# Patient Record
Sex: FEMALE | Race: WHITE | Employment: FULL TIME | URBAN - METROPOLITAN AREA
[De-identification: names, ages, dates, MRNs, and addresses within clinical notes are randomized per-mention and may not be internally consistent; named-entity substitution may affect disease eponyms.]

---

## 2018-03-17 ENCOUNTER — HOSPITAL ENCOUNTER (OUTPATIENT)
Dept: CT IMAGING | Age: 28
Discharge: HOME | End: 2018-03-17
Attending: OTOLARYNGOLOGY
Payer: COMMERCIAL

## 2018-03-17 DIAGNOSIS — J32.9 SINUSITIS: ICD-10-CM

## 2018-03-17 PROCEDURE — 70486 CT MAXILLOFACIAL W/O DYE: CPT

## 2018-09-07 PROBLEM — N20.0 RENAL STONE: Status: ACTIVE | Noted: 2018-09-07

## 2018-12-03 PROBLEM — M62.89 PELVIC FLOOR DYSFUNCTION IN FEMALE: Status: ACTIVE | Noted: 2018-12-03

## 2018-12-03 PROBLEM — K58.0 IRRITABLE BOWEL SYNDROME WITH DIARRHEA: Status: ACTIVE | Noted: 2018-03-28

## 2018-12-18 ENCOUNTER — OFFICE VISIT (OUTPATIENT)
Dept: CARDIOLOGY CLINIC | Age: 28
End: 2018-12-18

## 2018-12-18 VITALS
HEIGHT: 62 IN | BODY MASS INDEX: 24.66 KG/M2 | SYSTOLIC BLOOD PRESSURE: 109 MMHG | HEART RATE: 93 BPM | DIASTOLIC BLOOD PRESSURE: 72 MMHG | WEIGHT: 134 LBS

## 2018-12-18 DIAGNOSIS — Z76.89 ENCOUNTER TO ESTABLISH CARE: ICD-10-CM

## 2018-12-18 DIAGNOSIS — R01.1 HEART MURMUR: Primary | ICD-10-CM

## 2018-12-18 RX ORDER — CYCLOBENZAPRINE HCL 10 MG
TABLET ORAL AS NEEDED
COMMUNITY
End: 2019-02-25

## 2018-12-18 RX ORDER — NORGESTIMATE AND ETHINYL ESTRADIOL 0.25-0.035
1 KIT ORAL DAILY
COMMUNITY
End: 2019-07-05

## 2018-12-18 RX ORDER — ESCITALOPRAM OXALATE 10 MG/1
10 TABLET ORAL DAILY
COMMUNITY
End: 2019-02-25

## 2018-12-18 NOTE — PROGRESS NOTES
HISTORY OF PRESENT ILLNESS  Marizol Landrum is a 33 y.o. female. Patient is referred here for evaluation of heart murmur. She has no history of congenital heart disease. She had incidental finding of a heart murmur. Patient denies any shortness of breath, dizziness, palpitation, chest pain or other cardiac symptoms. She is fairly active and has no complaints with activity. New Patient   The history is provided by the patient. This is a recurrent problem. The current episode started more than 1 week ago. The problem occurs constantly. The problem has not changed since onset. Pertinent negatives include no chest pain and no shortness of breath. Nothing aggravates the symptoms. Nothing relieves the symptoms. Review of Systems   Constitutional: Negative for malaise/fatigue. Respiratory: Negative for cough, shortness of breath and wheezing. Cardiovascular: Negative for chest pain, palpitations, orthopnea, claudication, leg swelling and PND. Gastrointestinal: Negative for nausea and vomiting. Musculoskeletal: Negative for falls. Neurological: Negative for dizziness. Endo/Heme/Allergies: Does not bruise/bleed easily. Family History   Problem Relation Age of Onset    Atrial Fibrillation Maternal Grandmother     Atrial Fibrillation Maternal Grandfather     Other Paternal Grandmother         heart mumur       Past Medical History:   Diagnosis Date    Dysuria     Kidney stone     Pelvic pain     Urethritis        History reviewed. No pertinent surgical history. Social History     Tobacco Use    Smoking status: Never Smoker    Smokeless tobacco: Never Used   Substance Use Topics    Alcohol use: No     Frequency: Never       No Known Allergies    Prior to Admission medications    Medication Sig Start Date End Date Taking? Authorizing Provider   escitalopram oxalate (LEXAPRO) 10 mg tablet Take 10 mg by mouth daily.    Yes Provider, Historical   norgestimate-ethinyl estradiol (ORTHO-CYCLEN, 28,) 0.25-35 mg-mcg tab Take 1 Tab by mouth daily. Yes Provider, Historical   cyclobenzaprine (FLEXERIL) 10 mg tablet Take  by mouth as needed for Muscle Spasm(s). Yes Provider, Historical   naproxen sodium (ALEVE) 220 mg cap Take  by mouth. Provider, Historical   cyclobenzaprine (FLEXERIL) 10 mg tablet Take 1 Tab by mouth three (3) times daily as needed for Muscle Spasm(s). 12/3/18   Derick Frost MD   norgestimate-ethinyl estradiol (ORTHO TRI-CYCLEN LO) 0.18/0.215/0.25 mg-25 mcg tab  6/12/18   Provider, Historical         Visit Vitals  /72   Pulse 93   Ht 5' 2\" (1.575 m)   Wt 60.8 kg (134 lb)   BMI 24.51 kg/m²         Physical Exam   Constitutional: She is oriented to person, place, and time. She appears well-developed and well-nourished. Neck: No JVD present. Cardiovascular: Normal rate, regular rhythm and intact distal pulses. Exam reveals no gallop and no friction rub. No murmur heard. Pulmonary/Chest: Effort normal and breath sounds normal. No respiratory distress. She has no wheezes. She has no rales. She exhibits no tenderness. Abdominal: Soft. She exhibits no distension and no mass. There is no tenderness. Musculoskeletal: Normal range of motion. She exhibits no edema. Neurological: She is alert and oriented to person, place, and time. Skin: Skin is warm and dry. Psychiatric: She has a normal mood and affect. ASSESSMENT and PLAN    Ms. Hilda Izquierdo has a reminder for a \"due or due soon\" health maintenance. I have asked that she contact her primary care provider for follow-up on this health maintenance. No flowsheet data found. Assessment         ICD-10-CM ICD-9-CM    1. Heart murmur R01.1 785.2 AMB POC EKG ROUTINE W/ 12 LEADS, INTER & REP      ECHO ADULT COMPLETE   2. Encounter to establish care Z76.89 V65.8        There are no discontinued medications.     Orders Placed This Encounter    AMB POC EKG ROUTINE W/ 12 LEADS, INTER & REP     Order Specific Question:   Reason for Exam:     Answer:   heart murmur     12/2018 - Referred by OBGYN for heart murmur. She is asymptomatic. None audible today, likely flow murmur. Will obtain echocardiogram. EKG NSR. She is to have surgery for kidney stones in January. Follow-up Disposition:  Return in about 2 weeks (around 1/1/2019). I have independently evaluated taken history and examined the patient. All relevant labs and testing data's are reviewed. Care plan discussed and updated after review.   Chiqui Thapa MD

## 2018-12-18 NOTE — LETTER
Rene Shaikh 1990 12/18/2018 Dear Lloyd Hogue MD 
 
I had the pleasure of evaluating  Ms. Rina Dodd in office today. Below are the relevant portions of my assessment and plan of care. ICD-10-CM ICD-9-CM 1. Heart murmur R01.1 785.2 AMB POC EKG ROUTINE W/ 12 LEADS, INTER & REP  
   ECHO ADULT COMPLETE 2. Encounter to establish care Z76.89 V65.8 Current Outpatient Medications Medication Sig Dispense Refill  escitalopram oxalate (LEXAPRO) 10 mg tablet Take 10 mg by mouth daily.  norgestimate-ethinyl estradiol (ORTHO-CYCLEN, 28,) 0.25-35 mg-mcg tab Take 1 Tab by mouth daily.  cyclobenzaprine (FLEXERIL) 10 mg tablet Take  by mouth as needed for Muscle Spasm(s). Orders Placed This Encounter  AMB POC EKG ROUTINE W/ 12 LEADS, INTER & REP Order Specific Question:   Reason for Exam: Answer:   heart murmur  escitalopram oxalate (LEXAPRO) 10 mg tablet Sig: Take 10 mg by mouth daily.  norgestimate-ethinyl estradiol (ORTHO-CYCLEN, 28,) 0.25-35 mg-mcg tab Sig: Take 1 Tab by mouth daily.  cyclobenzaprine (FLEXERIL) 10 mg tablet Sig: Take  by mouth as needed for Muscle Spasm(s). If you have questions, please do not hesitate to call me. I look forward to following Ms. Rina Dodd along with you. Sincerely, Ewa Espino MD

## 2018-12-18 NOTE — PATIENT INSTRUCTIONS
Schedule echo    Follow up after testing         Transthoracic Echocardiogram: About This Test  What is it? An echocardiogram (also called an echo) uses sound waves to make an image of your heart. A device called a transducer sends sound waves that echo off your heart and back to the transducer. These echoes are turned into moving pictures of your heart that can be seen on a video screen. In a transthoracic echocardiogram (TTE), the transducer is moved across your chest or belly. A TTE is the most common type of echocardiogram.  Why is this test done? This test is done to check your heart health. It's used for many reasons. Your doctor may do an echocardiogram to:  · Check a heart murmur. · Look for the cause of shortness of breath or unexplained chest pain or pressure. · Check how well your heart is pumping blood. · Check to see how well your heart valves are working. · Look for blood clots inside your heart. What happens during the test?  · You will remove your clothes above your waist. You may be given a cloth or paper covering to use during the test.  · You will lie on your back or on your left side on a bed or table. · You may receive medicine through a vein (intravenously, or IV). The IV can be used to give you a contrast material, which helps your doctor get good views of your heart. · Small pads or patches (electrodes) will be taped to your arms and legs to record your heart rate during the test.  · A small amount of gel will be rubbed on the side of your chest to help  the sound waves. · The transducer will be pressed firmly against your chest and moved slowly back and forth. It is usually moved to different areas on your chest or belly to get specific views of your heart. · You will be asked to do several things, such as hold very still, breathe in and out very slowly, hold your breath, or lie on your left side. This test usually takes 30 to 60 minutes.   What else should you know about the test?  · You will not have any pain from an echocardiogram. You may have a brief, sharp pain if an intravenous (IV) needle is placed in a vein in your arm. · No electricity passes through your body during the test. There is no danger of getting an electrical shock. · You do not receive any radiation. What happens after the test?  · You will probably be able to go home right away. · You can go back to your usual activities right away. Follow-up care is a key part of your treatment and safety. Be sure to make and go to all appointments, and call your doctor if you are having problems. It's also a good idea to keep a list of the medicines you take. Ask your doctor when you can expect to have your test results. Where can you learn more? Go to http://rebel-sravanthi.info/. Enter E130 in the search box to learn more about \"Transthoracic Echocardiogram: About This Test.\"  Current as of: December 6, 2017  Content Version: 11.8  © 8074-6775 [employer]wise, Incorporated. Care instructions adapted under license by [employer] (which disclaims liability or warranty for this information). If you have questions about a medical condition or this instruction, always ask your healthcare professional. [place] [address] any warranty or liability for your use of this information.     [address] [facility] Jesse Salas, [address]   Echo schedule Dec. 27 check in at 10:45 am

## 2018-12-27 ENCOUNTER — HOSPITAL ENCOUNTER (OUTPATIENT)
Dept: NON INVASIVE DIAGNOSTICS | Age: 28
Discharge: HOME | End: 2018-12-27
Attending: NURSE PRACTITIONER
Payer: COMMERCIAL

## 2018-12-27 VITALS
WEIGHT: 134 LBS | BODY MASS INDEX: 24.66 KG/M2 | DIASTOLIC BLOOD PRESSURE: 72 MMHG | HEIGHT: 62 IN | SYSTOLIC BLOOD PRESSURE: 109 MMHG

## 2018-12-27 DIAGNOSIS — R01.1 HEART MURMUR: ICD-10-CM

## 2018-12-27 LAB
ECHO AO ROOT DIAM: 2.63 CM
ECHO LA AREA 4C: 14.4 CM2
ECHO LA VOL 2C: 25.79 ML (ref 22–52)
ECHO LA VOL 4C: 36.16 ML (ref 22–52)
ECHO LA VOL BP: 33.19 ML (ref 22–52)
ECHO LA VOL/BSA BIPLANE: 20.58 ML/M2 (ref 16–28)
ECHO LA VOLUME INDEX A2C: 16 ML/M2 (ref 16–28)
ECHO LA VOLUME INDEX A4C: 22.43 ML/M2 (ref 16–28)
ECHO LV E' LATERAL VELOCITY: 17.46 CM/S
ECHO LV E' SEPTAL VELOCITY: 14.13 CM/S
ECHO LV INTERNAL DIMENSION DIASTOLIC: 3.75 CM (ref 3.9–5.3)
ECHO LV INTERNAL DIMENSION SYSTOLIC: 2.05 CM
ECHO LV IVSD: 0.64 CM (ref 0.6–0.9)
ECHO LV MASS 2D: 73.9 G (ref 67–162)
ECHO LV MASS INDEX 2D: 45.8 G/M2 (ref 43–95)
ECHO LV POSTERIOR WALL DIASTOLIC: 0.77 CM (ref 0.6–0.9)
ECHO LVOT DIAM: 1.71 CM
ECHO LVOT PEAK GRADIENT: 5 MMHG
ECHO LVOT PEAK VELOCITY: 112.3 CM/S
ECHO LVOT VTI: 22.66 CM
ECHO MV A VELOCITY: 57.82 CM/S
ECHO MV E DECELERATION TIME (DT): 162.1 MS
ECHO MV E VELOCITY: 0.87 CM/S
ECHO MV E/A RATIO: 0.02
ECHO MV E/E' LATERAL: 0.05
ECHO MV E/E' RATIO (AVERAGED): 0.06
ECHO MV E/E' SEPTAL: 0.06
ECHO PULMONARY ARTERY SYSTOLIC PRESSURE (PASP): 20 MMHG
ECHO RV TAPSE: 2.07 CM (ref 1.5–2)
ECHO TV REGURGITANT MAX VELOCITY: 205.32 CM/S
ECHO TV REGURGITANT PEAK GRADIENT: 16.9 MMHG

## 2018-12-27 PROCEDURE — 93306 TTE W/DOPPLER COMPLETE: CPT

## 2019-02-13 ENCOUNTER — HOSPITAL ENCOUNTER (OUTPATIENT)
Dept: PHYSICAL THERAPY | Age: 29
Discharge: HOME | End: 2019-02-13
Payer: COMMERCIAL

## 2019-02-13 PROCEDURE — 97110 THERAPEUTIC EXERCISES: CPT

## 2019-02-13 PROCEDURE — 97161 PT EVAL LOW COMPLEX 20 MIN: CPT

## 2019-02-13 PROCEDURE — 97140 MANUAL THERAPY 1/> REGIONS: CPT

## 2019-02-13 NOTE — PROGRESS NOTES
PT DAILY TREATMENT NOTE 10-18    Patient Name: Sarrah Pallas  Date:2019  : 1990  [x]  Patient  Verified  Payor: Patricia Lopez / Plan: Anthony Smith / Product Type: PPO /    In time:3:47  Out time:4:45  Total Treatment Time (min): 58  Visit #: 1 of 10     Treatment Area: Low back pain [M54.5]        SUBJECTIVE  Pain Level (0-10 scale): 2  []constant [x]intermittent []improving []worsening []no change since onset     Any medication changes, allergies to medications, adverse drug reactions, diagnosis change, or new procedure performed?: [x] No    [] Yes (see summary sheet for update)  Subjective functional status/changes:        Subjective:  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 She reports radicular symptoms into the left lateral thigh, buttocks and low back. She reports that she is seeing a therapist for pelvic floor issues once a week in [facility]. Current pain is now daily and limits the ability to walk as much.         Mechanism of Injury: Unknown     FOTO: 53 / 72 in 10 visits     What type of work do you do? , stooping, walking     Living Situation/Domestic Life: Lives with boyfriend in a two story home with no problem with steps  Mobility: (I)  Self Care (I)     What type of daily activities/hobbies?  Walking, running     Limitations to Activity/Recreation/PLOF: Walking, running, can't lift heavy things for fear of increased pain,      Barriers: [x]pain []financial []time []transportation []other     Motivation: High     FABQ Score: []low []elevate     Cognition: A & O x 3    Other:     Risk For Falls:   []No  []low []elevate        OBJECTIVE/EXAMINATION     Gait: Decr Trunk Rot, decr (B) Hip flx  - Left lateral shift  - Decr mobility left Innominate in stork stance  - elev Right crest  - Ant Rot left ASIS  - Post Rot left Innominate  - (+) Right Slump Test  - (+) (B) Piriformis test  - Groin pain with piriformis str   - TTP with multiple trigger points right  > left at lower abdominals  - TTP (B) distal ADD region  - Elev right sacral base  - Trunk AROM (B) Rot WNL  - Fingertip - floor at 5cm with Left SI joint pain  - Fair stability with half Roll  - Decr initiation right multifidus                      AROM PROM Strength Pain? ?     Right Left Right Left Right Left     Hip Flx         4 3+     Hip Ext                 Knee Flx         4+ 5-     Knee Ext         5 5     HS 90/90 152 150                   30 min [x]Eval                  []Re-Eval         18 min Therapeutic Exercise:  [x] See flow sheet :   Rationale: increase ROM, increase strength and improve coordination to improve the patients ability to perform mor activity     10 min Manual Therapy:  Inf glide right sacral base, (B) Innominate p/a mobs, (B) LE LAD, (B) L/S and T/S Rot mobs   Rationale: decrease pain, increase ROM and increase tissue extensibility to tolerate increased activity levels                  With   [x] TE   [] TA   [] neuro   [] other: Patient Education: [x] Review HEP    [] Progressed/Changed HEP based on:   [] positioning   [] body mechanics   [] transfers   [] heat/ice application    [] other:       Other Objective/Functional Measures:        Pain Level (0-10 scale) post treatment: 1-2     ASSESSMENT/Changes in Function:   - Improved gait  - Improved pelvic alignment and mobility with treatment program     Patient will continue to benefit from skilled PT services to modify and progress therapeutic interventions, address functional mobility deficits, address ROM deficits, address strength deficits, analyze and address soft tissue restrictions and analyze and cue movement patterns to attain remaining goals.     []  See Plan of Care  []  See progress note/recertification  []  See Discharge Summary     Progress towards goals / Updated goals:        PLAN  [x]  Upgrade activities as tolerated     [x]  Continue plan of care  []  Update interventions per flow sheet       []  Discharge due to:_  []  Other:_          Behay Balbuena, PT 2/13/2019  3:47 PM    Future Appointments   Date Time Provider Kevin Newberry   2/15/2019  8:00 AM Hodan Jc, PT [facility] SCHED   2/25/2019  6:30 AM Hodan Jc, PT [facility] SCHED   2/25/2019  8:30 AM Jose Roberto Quezada MD [facility] SCHED   3/8/2019  6:30 AM Hodan Jc, PT [facility] SCHED   3/15/2019  6:30 AM Hodan Jc, PT [facility] SCHED   3/22/2019  7:00 AM Hodan Jc, PT [facility] SCHED   3/29/2019  6:30 AM Noel Padilla, PT [facility]

## 2019-02-18 ENCOUNTER — HOSPITAL ENCOUNTER (OUTPATIENT)
Dept: PHYSICAL THERAPY | Age: 29
Discharge: HOME | End: 2019-02-18
Payer: COMMERCIAL

## 2019-02-18 PROCEDURE — 97110 THERAPEUTIC EXERCISES: CPT

## 2019-02-18 PROCEDURE — 97140 MANUAL THERAPY 1/> REGIONS: CPT

## 2019-02-18 NOTE — PROGRESS NOTES
PT DAILY TREATMENT NOTE 10-18    Patient Name: Lesly Cope  Date:2019  : 1990  [x]  Patient  Verified  Payor: Sanchez Lancaster / Plan: William Alcantar RPN / Product Type: Commerical /    In time:3:36  Out time:4:22  Total Treatment Time (min): 46  Visit #: 2 of 10    Medicare/BCBS Only   Total Timed Codes (min):   1:1 Treatment Time:         Treatment Area: Low back pain [M54.5]    SUBJECTIVE  Pain Level (0-10 scale): 4/10  Any medication changes, allergies to medications, adverse drug reactions, diagnosis change, or new procedure performed?: [x] No    [] Yes (see summary sheet for update)  Subjective functional status/changes:   [] No changes reported  Pt report compliance with HEP but states her pelvic floor therapist does not want her performing any crunch ex. OBJECTIVE    16 min Therapeutic Exercise:  [] See flow sheet :   Rationale: increase ROM and increase strength to improve the patients ability to perform ADls with less difficulty. 30 min Manual Therapy:  Alignment check, MET to correct left upslip and left post. Rotated innominate. Rationale: decrease pain, increase ROM and increase tissue extensibility to perform ADLs with less difficulty. With   [] TE   [] TA   [] neuro   [] other: Patient Education: [x] Review HEP    [] Progressed/Changed HEP based on:   [] positioning   [] body mechanics   [] transfers   [] heat/ice application    [] other:      Other Objective/Functional Measures:      Pain Level (0-10 scale) post treatment: 0/10    ASSESSMENT/Changes in Function: Initiated ex. Per flow sheet. Held crunch ex. As requested by pelvic floor therapist.TC with some ex. Due to pt. Education and alignment correction, will resume next visit. No p! Was reported after therapy.     Patient will continue to benefit from skilled PT services to modify and progress therapeutic interventions, address functional mobility deficits, address ROM deficits, address strength deficits, analyze and address soft tissue restrictions, analyze and cue movement patterns, analyze and modify body mechanics/ergonomics and assess and modify postural abnormalities to attain remaining goals. []  See Plan of Care  []  See progress note/recertification  []  See Discharge Summary         Progress towards goals / Updated goals:  Short Term Goals: To be accomplished in 5 treatments:  1. Pt will be compliant and independent with HEP in order to facilitate PT sessions and aid with self management              Eval Status:  Initiated              Current Status:MET. Pt reports compliance. 2/18/19  2. Pt to tolerate 30 min or more of TE and/or Interventions w/o increased s/s              Eval Status:  Initiated              Current Status:                 Long Term Goals: To be accomplished in 10 treatments:  1. 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 Pt will demonstrate good pelvic mobility upon arrival to therapy session to show good core stability to tolerate changing positions without difficulty                Eval Status:  Initiated              Current Status:  4. Pt will will ambulate 1 mile on TM with good tolerance and no added pain to return to walking for exercise                Eval Status:  Initiated              Current Status:  5.   Pt will improve FOTO score to 72 in 10 visits to show significant improvement in function for progress to little difficulty performing usual work, activities and hobbies              Eval Status: 53              Current Status:        PLAN  [x]  Upgrade activities as tolerated     [x]  Continue plan of care  []  Update interventions per flow sheet       []  Discharge due to:_  []  Other:_      Kan Joseph PTA 2/18/2019  4:57 PM    Future Appointments   Date Time Provider Kevin Newberry   2/20/2019  2:30 PM Carolynn Calloway, [address]   2/21/2019  4:00 PM Live Riding, PT [facility]   2/25/2019  6:30 AM Carolynn Calloway, PT [facility] SCHED   2/25/2019  8:30 AM MD María Brown   2/25/2019  4:00 PM Live Riding, PT [facility]   2/27/2019  4:00 PM Wadell Hind [facility]   3/5/2019  4:30 PM Live Riding, PT [facility]   3/7/2019  4:00 PM Wadell Hind [facility]   3/8/2019  6:30 AM Carolynn Calloway, PT [facility] SCHED   3/13/2019  4:30 PM Live Riding, PT [facility]   3/15/2019  6:30 AM Carolynn Calloway, PT [facility] SCHED   3/22/2019  7:00 AM Carolynn Calloway, PT [facility] SCHED   3/29/2019  6:30 AM Funmilayo Diaz, PT [facility]

## 2019-02-21 ENCOUNTER — HOSPITAL ENCOUNTER (OUTPATIENT)
Dept: PHYSICAL THERAPY | Age: 29
Discharge: HOME | End: 2019-02-21
Payer: COMMERCIAL

## 2019-02-21 PROCEDURE — 97530 THERAPEUTIC ACTIVITIES: CPT

## 2019-02-21 PROCEDURE — 97140 MANUAL THERAPY 1/> REGIONS: CPT

## 2019-02-21 PROCEDURE — 97110 THERAPEUTIC EXERCISES: CPT

## 2019-02-21 NOTE — PROGRESS NOTES
PT DAILY TREATMENT NOTE 10-18    Patient Name: Ananth Naranjo  Date:2019  : 1990  [x]  Patient  Verified  Payor: Gato Benavides / Plan: Mahesh Still RPN / Product Type: Commerical /    In time:4:09  Out time:5:11  Total Treatment Time (min): 62  Visit #: 3 of 10    Treatment Area: Low back pain [M54.5]    SUBJECTIVE  Pain Level (0-10 scale): 0  Any medication changes, allergies to medications, adverse drug reactions, diagnosis change, or new procedure performed?: [x] No    [] Yes (see summary sheet for update)  Subjective functional status/changes:   [] No changes reported  Doing good just a little sore.   Doing all of the exercises    OBJECTIVE      31 min Therapeutic Exercise:  [x] See flow sheet :   Rationale: increase ROM, increase strength and improve coordination to improve the patients ability to tolerate normal activity    8 min Therapeutic Activity:  [x]  See flow sheet :   Rationale: increase ROM, increase strength and improve coordination  to improve the patients ability to ambulate moderate distance without difficulty     23 min Manual Therapy:  Inf glide right sacral base, (B) Innominate p/a mobs, (B) LE LAD, (B) L/S and T/S Rot mobs, STM/DTM (B) Lateral thigh and HS     Rationale: decrease pain, increase ROM, increase tissue extensibility and decrease trigger points to perform increased ADL          With   [] TE   [] TA   [] neuro   [] other: Patient Education: [x] Review HEP    [] Progressed/Changed HEP based on:   [] positioning   [] body mechanics   [] transfers   [] heat/ice application    [] other:      Other Objective/Functional Measures:   - Elev right sacral base  - Elev right crest  - decr pelvic sway left with ambulation  - Initiate Core strengthening     Pain Level (0-10 scale) post treatment: 0    ASSESSMENT/Changes in Function:   - Improved pelvic alignment and mobility with ambulation after Manual therapy  - Good exercise participation today with good tolerance to core training    Patient will continue to benefit from skilled PT services to modify and progress therapeutic interventions, address functional mobility deficits, address ROM deficits, address strength deficits, analyze and address soft tissue restrictions and analyze and cue movement patterns to attain remaining goals. []  See Plan of Care  []  See progress note/recertification  []  See Discharge Summary         Progress towards goals / Updated goals:  Short Term Goals: To be accomplished in 5 treatments:  1.  Pt will be compliant and independent with HEP in order to facilitate PT sessions and aid with self management              Eval Status:  Initiated              PLNLJSN Status:MET. Pt reports compliance.  2/18/19  2.  Pt to tolerate 30 min or more of TE and/or Interventions w/o increased s/s              Eval Status:  Initiated              VRPBWXN Status: Met at 62 min of treatment without increased s/s 2/21/19     Long Term Goals: To be accomplished in 10 treatments:  1.  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  Pt will demonstrate good pelvic mobility upon arrival to therapy session to show good core stability to tolerate changing positions without difficulty                Eval Status:  Initiated              QTBKGIS Status:  4.  Pt will will ambulate 1 mile on TM with good tolerance and no added pain to return to walking for exercise                Eval Status:  Initiated              CCRIFYY Status: Progressing at 1/4 mile 2/21/19  5.  Pt will improve FOTO score to 72 in 10 visits to show significant improvement in function for progress to little difficulty performing usual work, activities and hobbies              Eval Status: 53              Current Status:      PLAN  [x]  Upgrade activities as tolerated     [x]  Continue plan of care  []  Update interventions per flow sheet       []  Discharge due to:_  []  Other:_      Rodrigo Cerna, PT 2/21/2019  4:24 PM    Future Appointments   Date Time Provider Kevin Newberry   2/25/2019  6:30 AM Hamilton Reilly, PT [facility] SCHED   2/25/2019  8:30 AM Linda Gutierrez MD [facility] SCHED   2/25/2019  4:00 PM Cliff Hooper, [facility]   2/27/2019  4:00 PM Idania Garzon [facility]   3/5/2019  4:30 PM Jennifer Mcintyre, PT [facility]   3/7/2019  4:00 PM Idania Peon [facility]   3/8/2019  6:30 AM Hamilton Reilly, PT [facility] SCHED   3/13/2019  4:30 PM Jennifer Mcintyre, PT [facility]   3/15/2019  6:30 AM Hamilton Reilly, PT [facility] SCHED   3/22/2019  7:00 AM Hamilton Reilly, PT [facility] SCHED   3/29/2019  6:30 AM Anson Lay, PT [facility]

## 2019-02-25 ENCOUNTER — HOSPITAL ENCOUNTER (OUTPATIENT)
Dept: PHYSICAL THERAPY | Age: 29
Discharge: HOME | End: 2019-02-25
Payer: COMMERCIAL

## 2019-02-25 PROCEDURE — 97110 THERAPEUTIC EXERCISES: CPT

## 2019-02-25 NOTE — PROGRESS NOTES
PT DAILY TREATMENT NOTE 10-18    Patient Name: Ananth Naranjo  Date:2019  : 1990  [x]  Patient  Verified  Payor: Gato Benavides / Plan: Mahesh Still RPN / Product Type: Commerical /    In time:4:10  Out time:4:55  Total Treatment Time (min):45  Visit #: 4 of 10    Treatment Area: Low back pain [M54.5]    SUBJECTIVE  Pain Level (0-10 scale): 5/10  Any medication changes, allergies to medications, adverse drug reactions, diagnosis change, or new procedure performed?: [x] No    [] Yes (see summary sheet for update)  Subjective functional status/changes:   [] No changes reported  Pt states she went on a field trip with her class on Friday and was standing for a while which caused increased discomfort. Pt says she had pelvic floor therapy today and got dry needled in her left piriformis, which was also uncomfortable, but alignment went back in without MET    OBJECTIVE      45 min Therapeutic Exercise:  [x] See flow sheet :   Rationale: increase ROM, increase strength and improve coordination to improve the patients ability to tolerate normal activity. With   [] TE   [] TA   [] neuro   [] other: Patient Education: [x] Review HEP    [] Progressed/Changed HEP based on:   [] positioning   [] body mechanics   [] transfers   [] heat/ice application    [] other:      Other Objective/Functional Measures:   Pain Level (0-10 scale) post treatment: 5/10    ASSESSMENT/Changes in Function:  Alignment WNLs. Pt was able to perform ex. With little discomfort. No change in pain was reported post therapy. Patient will continue to benefit from skilled PT services to modify and progress therapeutic interventions, address functional mobility deficits, address ROM deficits, address strength deficits, analyze and address soft tissue restrictions and analyze and cue movement patterns to attain remaining goals.      []  See Plan of Care  []  See progress note/recertification  []  See Discharge Summary         Progress towards goals / Updated goals:  Short Term Goals: To be accomplished in 5 treatments:  1.  Pt will be compliant and independent with HEP in order to facilitate PT sessions and aid with self management              Eval Status:  Initiated              VNTZTME Status:MET. Pt reports compliance.  2/18/19  2.  Pt to tolerate 30 min or more of TE and/or Interventions w/o increased s/s              Eval Status:  Initiated              PKTDXNT Status: Met at 62 min of treatment without increased s/s 2/21/19     Long Term Goals: To be accomplished in 10 treatments:  1.  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  Pt will demonstrate good pelvic mobility upon arrival to therapy session to show good core stability to tolerate changing positions without difficulty                Eval Status:  Initiated              XFUGUWD Status:  4.  Pt will will ambulate 1 mile on TM with good tolerance and no added pain to return to walking for exercise                Eval Status:  Initiated              GLHKLAG Status: Progressing at 1/4 mile 2/21/19  5.  Pt will improve FOTO score to 72 in 10 visits to show significant improvement in function for progress to little difficulty performing usual work, activities and hobbies              Eval Status: 53              Current Status:      PLAN  [x]  Upgrade activities as tolerated     [x]  Continue plan of care  []  Update interventions per flow sheet       []  Discharge due to:_  []  Other:_      Enmanuel Calix, PRESLEY 2/25/2019  4:24 PM    Future Appointments   Date Time Provider Kevin Newberry   2/27/2019  4:00 PM Jeffry General [facility]   3/5/2019  4:30 PM Carrol Mendez, PT [facility]   3/7/2019  4:00 PM Jeffry General [facility]   3/8/2019  6:30 AM Chante Desouza, [address] [place] [address] Road   3/13/2019  4:30 PM Ashley Ahuja, [facility]   3/15/2019  6:30 AM Mary Jane Luciano, PT [facility] JABIER SCHED   3/22/2019  7:00 AM Mary Jane Luciano, PT [facility] JABIER SCHED   3/29/2019  6:30 AM Mary Jane Luciano, PT [facility] JABIER SCHED   4/22/2019  2:30 PM Alma Rosa Grant MD [address] [facility]

## 2019-02-27 ENCOUNTER — HOSPITAL ENCOUNTER (OUTPATIENT)
Dept: PHYSICAL THERAPY | Age: 29
Discharge: HOME | End: 2019-02-27
Payer: COMMERCIAL

## 2019-02-27 PROCEDURE — 97110 THERAPEUTIC EXERCISES: CPT

## 2019-02-27 NOTE — PROGRESS NOTES
PT DAILY TREATMENT NOTE 10-18    Patient Name: Bonifacio Allan  Date:2019  : 1990  [x]  Patient  Verified  Payor: Cheswick Timoteo / Plan: [id number] [employer] [place] RPN / Product Type: Commerical /    In time:4:10  Out time:4:50  Total Treatment Time (min):40  Visit #: 5 of 10    Treatment Area: Low back pain [M54.5]    SUBJECTIVE  Pain Level (0-10 scale): 3/10  Any medication changes, allergies to medications, adverse drug reactions, diagnosis change, or new procedure performed?: [x] No    [] Yes (see summary sheet for update)  Subjective functional status/changes:   [] No changes reported  \"I feel a lot better today. \"    OBJECTIVE      40 min Therapeutic Exercise:  [x] See flow sheet :   Rationale: increase ROM, increase strength and improve coordination to improve the patients ability to tolerate normal activity. With   [] TE   [] TA   [] neuro   [] other: Patient Education: [x] Review HEP    [] Progressed/Changed HEP based on:   [] positioning   [] body mechanics   [] transfers   [] heat/ice application    [] other:      Other Objective/Functional Measures:   Pain Level (0-10 scale) post treatment: 2/10    ASSESSMENT/Changes in Function:  Alignment WNLs. Pt was able to perform ex. With no discomfort. Pt reported decreased pain after therapy. Patient will continue to benefit from skilled PT services to modify and progress therapeutic interventions, address functional mobility deficits, address ROM deficits, address strength deficits, analyze and address soft tissue restrictions and analyze and cue movement patterns to attain remaining goals.      []  See Plan of Care  []  See progress note/recertification  []  See Discharge Summary         Progress towards goals / Updated goals:  Short Term Goals: To be accomplished in 5 treatments:  1.  Pt will be compliant and independent with HEP in order to facilitate PT sessions and aid with self management              Eval Status:  Initiated              [facility] Status:MET. Pt reports compliance. 2/18/19  2.  Pt to tolerate 30 min or more of TE and/or Interventions w/o increased s/s              Eval Status:  Initiated              DCROUIJ Status: Met at 62 min of treatment without increased s/s 2/21/19     Long Term Goals: To be accomplished in 10 treatments:  1.  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  Pt will demonstrate good pelvic mobility upon arrival to therapy session to show good core stability to tolerate changing positions without difficulty                Eval Status:  Initiated              ONHALAC Status: MET.  Alignment WNLs 2 visits in a row. 2/27/19  4.  Pt will will ambulate 1 mile on TM with good tolerance and no added pain to return to walking for exercise                Eval Status:  Initiated              XQZXXYV Status: Progressing at 1/4 mile 2/21/19  5.  Pt will improve FOTO score to 72 in 10 visits to show significant improvement in function for progress to little difficulty performing usual work, activities and hobbies              Eval Status: 53              Current Status:      PLAN  [x]  Upgrade activities as tolerated     [x]  Continue plan of care  []  Update interventions per flow sheet       []  Discharge due to:_  []  Other:_      Segundo Granados PTA 2/27/2019  4:24 PM    Future Appointments   Date Time Provider Kevin Newberry   3/5/2019  4:30 PM Israel Workman PT [facility]   3/7/2019  4:00 PM Jaziel Larose [facility]   3/8/2019  6:30 AM DIANE Campos   3/13/2019  4:30 PM Israel Workman PT [facility]   3/15/2019  6:30 AM Karolina San PT [facility] JABIER SCHED   3/22/2019  7:00 AM Karolina San PT [facility] JABIER SCHED   3/29/2019  6:30 AM Karolina San PT [facility] JABIER SCHED   4/22/2019  2:30 PM [place]

## 2019-03-05 ENCOUNTER — HOSPITAL ENCOUNTER (OUTPATIENT)
Dept: PHYSICAL THERAPY | Age: 29
Discharge: HOME | End: 2019-03-05
Payer: COMMERCIAL

## 2019-03-05 PROCEDURE — 97140 MANUAL THERAPY 1/> REGIONS: CPT

## 2019-03-05 PROCEDURE — 97110 THERAPEUTIC EXERCISES: CPT

## 2019-03-05 NOTE — PROGRESS NOTES
PT DAILY TREATMENT NOTE 10-18    Patient Name: Clifford Barrow  Date:3/5/2019  : 1990  [x]  Patient  Verified  Payor: Vandana Hill / Plan: Doyle Boland RPN / Product Type: Commerical /    In time:4:39  Out time:5:52  Total Treatment Time (min): 68  Visit #: 6 of 10    Treatment Area: Low back pain [M54.5]    SUBJECTIVE  Pain Level (0-10 scale): 4  Any medication changes, allergies to medications, adverse drug reactions, diagnosis change, or new procedure performed?: [x] No    [] Yes (see summary sheet for update)  Subjective functional status/changes:   [] No changes reported  Pain at (B) lateral thigh    OBJECTIVE    Modality rationale: decrease inflammation and decrease pain to improve the patients ability to tolerate increased activity   Min Type Additional Details    [] Estim:  []Unatt       []IFC  []Premod                        []Other:  []w/ice   []w/heat  Position:  Location:    [] Estim: []Att    []TENS instruct  []NMES                    []Other:  []w/US   []w/ice   []w/heat  Position:  Location:    []  Traction: [] Cervical       []Lumbar                       [] Prone          []Supine                       []Intermittent   []Continuous Lbs:  [] before manual  [] after manual    []  Ultrasound: []Continuous   [] Pulsed                           []1MHz   []3MHz W/cm2:  Location:    []  Iontophoresis with dexamethasone         Location: [] Take home patch   [] In clinic   10 [x]  Ice     []  heat  []  Ice massage  []  Laser   []  Anodyne Position:  Location:    []  Laser with stim  []  Other:  Position:  Location:    []  Vasopneumatic Device Pressure:       [] lo [] med [] hi   Temperature: [] lo [] med [] hi   [x] Skin assessment post-treatment:  [x]intact []redness- no adverse reaction    []redness - adverse reaction:     40 min Therapeutic Exercise:  [x] See flow sheet :   Rationale: increase ROM, increase strength and improve coordination to improve the patients ability to .perform increased activity    18 min Manual Therapy:  STM/DTM with roller at (B) ITB/Quad   Rationale: decrease pain, increase ROM, increase tissue extensibility and decrease trigger points to return to normal activity       With   [x] TE   [] TA   [] neuro   [] other: Patient Education: [x] Review HEP    [] Progressed/Changed HEP based on:   [] positioning   [] body mechanics   [] transfers   [] heat/ice application    [] other:      Other Objective/Functional Measures:   - Good innominate mobility and alignment today upon arrival   - Pt arrives in good Pelvic alignment and mobility in stork stance  - fingertip - floor at 0cm  - Good alignment of sacral base      Pain Level (0-10 scale) post treatment: 2    ASSESSMENT/Changes in Function:   - Good response with treatment program with good Innominate mobility and pelvic alignment  - Pt showing improved alignment and mobility of the pelvic and sacral base  - Increased initiation of (B) Multifidus indicating increased core strength. Patient will continue to benefit from skilled PT services to modify and progress therapeutic interventions, address functional mobility deficits, address ROM deficits, address strength deficits, analyze and address soft tissue restrictions and analyze and cue movement patterns to attain remaining goals. []  See Plan of Care  []  See progress note/recertification  []  See Discharge Summary         Progress towards goals / Updated goals:  Short Term Goals: To be accomplished in 5 treatments:  1.  Pt will be compliant and independent with HEP in order to facilitate PT sessions and aid with self management              Eval Status:  Initiated              STMLGDT Status:MET. Pt reports compliance.  2/18/19  2.  Pt to tolerate 30 min or more of TE and/or Interventions w/o increased s/s              Eval Status:  Initiated              AUAWOWK Status: Met at 62 min of treatment without increased s/s 2/21/19     Long Term Goals: To be accomplished in 10 treatments:  1.  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  Pt will demonstrate good pelvic mobility upon arrival to therapy session to show good core stability to tolerate changing positions without difficulty                Eval Status:  Initiated              NRUORQY Status: MET. Alignment WNLs last 3 visits.  3/5/19  4.  Pt will will ambulate 1 mile on TM with good tolerance and no added pain to return to walking for exercise                Eval Status:  Initiated              NXDXKNZ Status: Progressing at 1/4 mile 2/21/19  5.  Pt will improve FOTO score to 72 in 10 visits to show significant improvement in function for progress to little difficulty performing usual work, activities and hobbies              Eval Status: 53              Current Status:        PLAN  [x]  Upgrade activities as tolerated     [x]  Continue plan of care  []  Update interventions per flow sheet       []  Discharge due to:_  []  Other:_      Patricia Hamlin, PT 3/5/2019  5:32 PM    Future Appointments   Date Time Provider Kevin Newberry   3/7/2019  4:00 PM Bryson Merida [facility]   3/8/2019  6:30 AM Roberto Carlos Pulse, PT María   3/12/2019  4:00 PM Tanisha Samuels PTA [facility]   3/13/2019  4:30 PM Ye Saeed, PT [facility]   3/15/2019  6:30 AM Roberto Carlos Pulse, PT María   3/18/2019  3:30 PM Tanisha Samuels PTA [facility]   3/21/2019  5:30 PM Ye Saeed, PT [facility]   3/22/2019  7:00 AM Roberto Carlos Pulse, PT María   3/26/2019  4:30 PM Ye Saeed, PT [facility]   3/28/2019  4:00 PM [facility]   3/29/2019  6:30 AM Roberto Carlos Torres, PT [facility]   4/22/2019  2:30 PM [facility]

## 2019-03-07 ENCOUNTER — HOSPITAL ENCOUNTER (OUTPATIENT)
Dept: PHYSICAL THERAPY | Age: 29
Discharge: HOME | End: 2019-03-07
Payer: COMMERCIAL

## 2019-03-07 PROCEDURE — 97112 NEUROMUSCULAR REEDUCATION: CPT

## 2019-03-07 PROCEDURE — 97110 THERAPEUTIC EXERCISES: CPT

## 2019-03-07 NOTE — PROGRESS NOTES
PT DAILY TREATMENT NOTE 10-18    Patient Name: Kayla Coleman  Date:3/7/2019  : 1990  [x]  Patient  Verified  Payor: Helen Whaley / Plan: Ernesto Thrasher RPN / Product Type: Commerical /    In time:4:13  Out time:4:55  Total Treatment Time (min): 42  Visit #: 7 of 10    Medicare/[facility] Only   Total Timed Codes (min):   1:1 Treatment Time:         Treatment Area: Low back pain [M54.5]    SUBJECTIVE  Pain Level (0-10 scale): 3/10  Any medication changes, allergies to medications, adverse drug reactions, diagnosis change, or new procedure performed?: [x] No    [] Yes (see summary sheet for update)  Subjective functional status/changes:   [] No changes reported  \"Still feeling good. \"    OBJECTIVE      17 min Therapeutic Exercise:  [] See flow sheet :   Rationale: increase ROM and increase strength to improve the patients ability to perform ADLs with less difficulty with less difficulty. 25 min Neuromuscular Re-education:  []  See flow sheet :OOv ex. Rationale: increase ROM, increase strength, improve coordination and improve balance  to improve the patients ability to perform ADLs with less difficulty. With   [] TE   [] TA   [] neuro   [] other: Patient Education: [x] Review HEP    [] Progressed/Changed HEP based on:   [] positioning   [] body mechanics   [] transfers   [] heat/ice application    [] other:      Other Objective/Functional Measures:      Pain Level (0-10 scale) post treatment: 2/10    ASSESSMENT/Changes in Function:  Added Oov ex.per flow sheet. Pt reported feeling challenged with Oov but was able to stabilize and perform all ex. Alignment was WNLs today. Less pain reported post ex.     Patient will continue to benefit from skilled PT services to modify and progress therapeutic interventions, address functional mobility deficits, address ROM deficits, address strength deficits, analyze and address soft tissue restrictions, analyze and cue movement patterns and analyze and modify body mechanics/ergonomics to attain remaining goals. []  See Plan of Care  []  See progress note/recertification  []  See Discharge Summary         Progress towards goals / Updated goals:  Short Term Goals: To be accomplished in 5 treatments:  1.  Pt will be compliant and independent with HEP in order to facilitate PT sessions and aid with self management              Eval Status:  Initiated              YWIAPFX Status:MET. Pt reports compliance. 2/18/19  2.  Pt to tolerate 30 min or more of TE and/or Interventions w/o increased s/s              Eval Status:  Initiated              NDDBGGS Status: Met at 62 min of treatment without increased s/s 2/21/19     Long Term Goals: To be accomplished in 10 treatments:  1.  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  Pt will demonstrate good pelvic mobility upon arrival to therapy session to show good core stability to tolerate changing positions without difficulty                Eval Status:  Initiated              NZHZMGH Status: MET. Alignment WNLs last 3 visits.  3/5/19  4.  Pt will will ambulate 1 mile on TM with good tolerance and no added pain to return to walking for exercise                Eval Status:  Initiated              LTIMJQR Status: Progressing at 1/4 mile 2/21/19  5.  Pt will improve FOTO score to 72 in 10 visits to show significant improvement in function for progress to little difficulty performing usual work, activities and hobbies              Eval Status: 53              Current Status:           PLAN  [x]  Upgrade activities as tolerated     [x]  Continue plan of care  []  Update interventions per flow sheet       []  Discharge due to:_  []  Other:_      Seb Pugh PTA 3/7/2019  4:48 PM    Future Appointments   Date Time Provider Kevin Newberry   3/8/2019  6:30 AM Jeaneen Seip, PT [facility] JABIER SCHED   3/12/2019  4:00 PM Melanie Ovalo [facility]   3/13/2019  4:30 PM Tiffanie Wheeler, PT [facility]   3/15/2019  6:30 AM Ron Barnes, DIANE Daniel   3/18/2019  3:30 PM Sandra Nunes, PTA [facility]   3/21/2019  5:30 PM Tiffanie Wheeler, PT [facility]   3/22/2019  7:00 AM Ron Barnes, DIANE Daniel   3/26/2019  4:30 PM Tiffanie Wheeler, PT [facility]   3/28/2019  4:00 PM Tiffanie Wheeler, PT [facility]   3/29/2019  6:30 AM Ron Barnes, PT [facility] JABIER SCHED   4/22/2019  2:30 PM MD María Salas

## 2019-03-12 ENCOUNTER — HOSPITAL ENCOUNTER (OUTPATIENT)
Dept: PHYSICAL THERAPY | Age: 29
Discharge: HOME | End: 2019-03-12
Payer: COMMERCIAL

## 2019-03-12 PROCEDURE — 97110 THERAPEUTIC EXERCISES: CPT

## 2019-03-12 NOTE — PROGRESS NOTES
PT DAILY TREATMENT NOTE 10-18    Patient Name: Sharon Baptiste  Date:3/12/2019  : 1990  [x]  Patient  Verified  Payor: Feroz Maynard / Plan: [id number] [place] RPN / Product Type: Commerical /    In time:4:05  Out time:5:00  Total Treatment Time (min): 55  Visit #: 8 of 10    Medicare/BCBS Only   Total Timed Codes (min):   1:1 Treatment Time:         Treatment Area: Low back pain [M54.5]    SUBJECTIVE  Pain Level (0-10 scale): 4/10  Any medication changes, allergies to medications, adverse drug reactions, diagnosis change, or new procedure performed?: [x] No    [] Yes (see summary sheet for update)  Subjective functional status/changes:   [] No changes reported  Pt states she is having increased pain/discomfort in the lateral aspects of both thighs. OBJECTIVE      55 min Therapeutic Exercise:  [] See flow sheet :   Rationale: increase ROM and increase strength to improve the patients ability to perform ADLs with less difficulty with less difficulty. With   [x] TE   [] TA   [] neuro   [] other: Patient Education: [x] Review HEP    [x] Progressed/Changed HEP based on:   [] positioning   [] body mechanics   [] transfers   [x] heat/ice application    [] other:      Other Objective/Functional Measures:      Pain Level (0-10 scale) post treatment: 3/10    ASSESSMENT/Changes in Function:  Added ITB stretch to HEP due to pt. Feeling relief during stretch. Advised pt. To apply CP to her hip for 10 mins to see if there is a decrease in pain. Alignment WNLs today. Held most ex. Due to pt. Education on ITB and hip anatomy. Will resume ex. Program NV. Patient will continue to benefit from skilled PT services to modify and progress therapeutic interventions, address functional mobility deficits, address ROM deficits, address strength deficits, analyze and address soft tissue restrictions, analyze and cue movement patterns and analyze and modify body mechanics/ergonomics to attain remaining goals.      []  See Plan of Care  []  See progress note/recertification  []  See Discharge Summary         Progress towards goals / Updated goals:  Short Term Goals: To be accomplished in 5 treatments:  1.  Pt will be compliant and independent with HEP in order to facilitate PT sessions and aid with self management              Eval Status:  Initiated              BPSDHOD Status:MET. Pt reports compliance. 2/18/19  2.  Pt to tolerate 30 min or more of TE and/or Interventions w/o increased s/s              Eval Status:  Initiated              NOKDPMD Status: Met at 62 min of treatment without increased s/s 2/21/19     Long Term Goals: To be accomplished in 10 treatments:  1.  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 3/12/19  3.  Pt will demonstrate good pelvic mobility upon arrival to therapy session to show good core stability to tolerate changing positions without difficulty                Eval Status:  Initiated              XVFQHBM Status: MET. Alignment WNLs last 4 visits.  3/12/19  4.  Pt will will ambulate 1 mile on TM with good tolerance and no added pain to return to walking for exercise                Eval Status:  Initiated              PMOVSHR Status: Progressing at 1/4 mile 2/21/19  5.  Pt will improve FOTO score to 72 in 10 visits to show significant improvement in function for progress to little difficulty performing usual work, activities and hobbies              Eval Status: 53              Current Status:            PLAN  [x]  Upgrade activities as tolerated     [x]  Continue plan of care  []  Update interventions per flow sheet       []  Discharge due to:_  []  Other:_      Avery [place], PTA 3/12/2019  4:48 PM    Future Appointments   Date Time Provider Kevin Newberry   3/13/2019  4:30 PM Rosalia Pritchard, PT [facility]   3/15/2019  6:30 AM Margret Zavala, PT [facility] JABIER SCHED   3/18/2019  3:30 PM Mckay Macedo, PRESLEY [facility]   3/21/2019  5:30 PM Amberly Calixto, PT [facility]   3/22/2019  7:00 AM Margret Zavala, DIANE [address]   3/26/2019  4:30 PM Amberly Calixto, PT [facility]   3/28/2019  4:00 PM Amberly Calixto, PT [facility]   3/29/2019  6:30 AM Margret Zavala, PT [facility] JABIER SCHED   4/22/2019  2:30 PM Gavin Lima MD [address]

## 2019-03-13 ENCOUNTER — HOSPITAL ENCOUNTER (OUTPATIENT)
Dept: PHYSICAL THERAPY | Age: 29
Discharge: HOME | End: 2019-03-13
Payer: COMMERCIAL

## 2019-03-13 PROCEDURE — 97110 THERAPEUTIC EXERCISES: CPT

## 2019-03-13 PROCEDURE — 97140 MANUAL THERAPY 1/> REGIONS: CPT

## 2019-03-13 NOTE — PROGRESS NOTES
PT DAILY TREATMENT NOTE 10-18    Patient Name: Bonifacio Allan  Date:3/13/2019  : 1990  [x]  Patient  Verified  Payor: Irlanda Mahmood / Plan: Liza MARISCAL / Product Type: Commerical /    In time:4:32  Out time:5:42  Total Treatment Time (min): 60  Visit #: 9 of 10    Medicare/BCBS Only   Total Timed Codes (min):    1:1 Treatment Time:          Treatment Area: Low back pain [M54.5]    SUBJECTIVE  Pain Level (0-10 scale): 5  Any medication changes, allergies to medications, adverse drug reactions, diagnosis change, or new procedure performed?: [x] No    [] Yes (see summary sheet for update)  Subjective functional status/changes:   [] No changes reported  The back pain is about a 2 and the hip pain is 5 with pain going down the sides of the legs    OBJECTIVE      40 min Therapeutic Exercise:  [] See flow sheet : Patient education   Rationale: increase ROM, increase strength and improve coordination to improve the patients ability to perform more activity without added pain    10 min Manual Therapy:  (B) Innominate p/a mobs, P/A mobs left sacral base   Rationale: decrease pain, increase ROM and increase tissue extensibility to perform activity with daily work tasks      With   [x] TE   [] TA   [] neuro   [] other: Patient Education: [x] Review HEP    [] Progressed/Changed HEP based on:   [] positioning   [] body mechanics   [] transfers   [] heat/ice application    [] other:      Other Objective/Functional Measures:   - Multiple Tender points and muscle tightness (B) lateral quad/TFL/Piriformis/Superior Glute/Glute medius and minimus  - Post tilt left sacral base  - VCs to correct piriformis stretch  - add Glute strengthening exercises with HEP provided      Pain Level (0-10 scale) post treatment: 3    ASSESSMENT/Changes in Function:   - Pt with improved core stability and good (B) multifidus initiation  - Increased strength with MMT (B) Hip Flx 4-4+/5  - Weakness (B) Hip ER    Patient will continue to benefit from skilled PT services to modify and progress therapeutic interventions, address functional mobility deficits, address ROM deficits, address strength deficits, analyze and address soft tissue restrictions and analyze and cue movement patterns to attain remaining goals. []  See Plan of Care  []  See progress note/recertification  []  See Discharge Summary         Progress towards goals / Updated goals:  Short Term Goals: To be accomplished in 5 treatments:  1.  Pt will be compliant and independent with HEP in order to facilitate PT sessions and aid with self management              Eval Status:  Initiated              YAKSOMS Status:MET. Pt reports compliance. 2/18/19  2.  Pt to tolerate 30 min or more of TE and/or Interventions w/o increased s/s              Eval Status:  Initiated              XMIUNLM Status: Met at 62 min of treatment without increased s/s 2/21/19     Long Term Goals: To be accomplished in 10 treatments:  1.  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  Pt will demonstrate good pelvic mobility upon arrival to therapy session to show good core stability to tolerate changing positions without difficulty                Eval Status:  Initiated              KELSEYU Status: MET.  Alignment WNLs last 3 visits. 3/5/19  4.  Pt will will ambulate 1 mile on TM with good tolerance and no added pain to return to walking for exercise                Eval Status:  Initiated              SRVJMELISAKY Status: Progressing at 1/4 mile 2/21/19  5.  Pt will improve FOTO score to 72 in 10 visits to show significant improvement in function for progress to little difficulty performing usual work, activities and hobbies  Sabrina Dodson Status: 53              Current Status: Progressing at 61 3/13/19        PLAN  [x]  Upgrade activities as tolerated     [x]  Continue plan of care  []  Update interventions per flow sheet       []  Discharge due to:_  []  Other:_      Brenna Rm, PT 3/13/2019  5:17 PM    Future Appointments   Date Time Provider Kevin Newberry   3/15/2019  6:30 AM Barrera Polanco, PT Alyssa Fu   3/18/2019  3:30 PM Catherine Ortiz, PTA [facility]   3/21/2019  5:30 PM Karlos Fraga, PT [facility]   3/22/2019  7:00 AM Barrera Polanco, DIANE Fu   3/26/2019  4:30 PM Karlos Fraga, PT [facility]   3/28/2019  4:00 PM Karlos Fraga, PT [facility]   3/29/2019  6:30 AM Barrera Polanco, PT ZACHARIAH MCKENNA   4/22/2019  2:30 PM MD Alyssa Solorzano

## 2019-03-13 NOTE — PROGRESS NOTES
Request for use of Dry Needling/Intramuscular Manual Therapy  Patient: Enrique Whitfield                                                   Referral Source: Patt Larkin MD  Diagnosis: Low back pain [M54.5]                                                                :  1990  Date of initial visit: 19                                 Attended visits: 9  Missed Visits: 0    Based on findings from the physical therapy examination and evaluation, the evaluating therapist believes the patient, Enrique Whitfield  would benefit from including Dry Needling as part of the plan of care. Dry needling is a treatment technique utilized in conjunction with other PT interventions to inactivate myofascial trigger points and the pain and dysfunction they cause. Dry Needling is an advanced procedure that requires additional training including greater than 54 hours of intensive course work. Physical Therapists at [address] [place] [address] are certified through [address] [place] [address] courses for their education and are certified to perform treatments. PROCEDURE:   Solid filament sterile needle (typically 0.3mm/30 gauge) inserted into a trigger point   Repeated movements inactivate the trigger points, taking 30-60 seconds per site   Typically consists of 1 dry needling session per week and a possible second treatment including muscle re-education, flexibility, strengthening and other manual techniques to facilitate the benefits of dry needling     BENEFITS:   Inactivation of trigger points   Decreased pain   Increased muscle length   Improved movement patterns   Restoration of function POTENTIAL RISKS:   Post-needling soreness   Infection   Bruising/bleeding   Penetration of a nerve   Pneumothorax   All treating PTs have been thoroughly educated in avoiding adverse reactions    If you agree with this recommendation, please sign this form and fax it to us at [phone].   If you have questions or concerns regarding dry needling or any other treatment we may be providing, please contact us at [phone]. Thank you for allowing us to assist in the care of your patient. Arlene Coronel, PT                                               3/13/2019 6:27 PM                NOTE TO PHYSICIAN:  PLEASE COMPLETE THE ORDERS BELOW AND   FAX TO [facility]: [phone]  If you are unable to process this request in 24 hours please contact our office:   [phone]    I have read the above request and AGREE to the recommendation of including dry needling as part of the plan of care.     Physicians signature: _______________________________________________     Date: ______________Time:_______________

## 2019-03-13 NOTE — PROGRESS NOTES
[facility] at [address] [facility] [address]  Phone: [phone]      Fax:  [phone]    Progress Note  Patient name: Handy Bryant Start of Care: 2019   Referral source: Komal Egan MD : 1990               Medical Diagnosis: Low back pain [M54.5]  Payor: Bhanu Sweet / Plan: [facility] / Product Type: PPO /  Onset Date:2018with recent exaccerbations and more frequent pain               Treatment Diagnosis: Pelvic obliquity, Neural tension (B) LE in the sciatic nerve pattern   Prior Hospitalization: see medical history Provider#: [id number]   Medications: Verified on Patient summary List    Comorbidities: Pelvic Floor Dysfunction   Prior Level of Function: walk 3 times a day for a mile and a half with pain 2-3 times a week        Visits from Start of Care: 9    Missed Visits: 0    Established Goals:          Excellent Good         Limited           None  [] Increased ROM   []  [x]  []  []  [] Increased Strength  []  [x]  []  []  [] Increased Mobility  []  [x]  []  []   [] Decreased Pain   []  []  [x]  []  [] Decreased Swelling  []  []  []  []    Key Functional Changes: Patient has shown good progress with this treatment program. Pain as of last visit was 2/10 LBP 5/10 Hip pain. Patient has shown decreased pain and increased core/Hip Flx strength and pelvic mobility. Patient reports improvement with overall involvement. FOTO score is 63. All STG/LTGs achieved as identified below. Fall Risk Assessment: Patient demonstrates no Fall Risk  Progress towards goals / Updated goals:  Short Term Goals: To be accomplished in 5 treatments:  1.  Pt will be compliant and independent with HEP in order to facilitate PT sessions and aid with self management              Eval Status:  Initiated              OFFEAQT Status:MET. Pt reports compliance.  19  2.  Pt to tolerate 30 min or more of TE and/or Interventions w/o increased s/s              Eval Status:  Initiated              POENLCZ Status: Met at 62 min of treatment without increased s/s 2/21/19     Long Term Goals: To be accomplished in 10 treatments:  1.  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  Pt will demonstrate good pelvic mobility upon arrival to therapy session to show good core stability to tolerate changing positions without difficulty                Eval Status:  Initiated              ETMEZWB Status: MET.  Alignment WNLs last 3 visits. 3/5/19  4.  Pt will will ambulate 1 mile on TM with good tolerance and no added pain to return to walking for exercise                Eval Status:  Initiated              UDOLLPM Status: Progressing at 1/4 mile 2/21/19  5.  Pt will improve FOTO score to 72 in 10 visits to show significant improvement in function for progress to little difficulty performing usual work, activities and hobbies              Eval Status: 53              Current Status: Progressing at 61 3/13/19         Updated Goals: to be achieved in 10 treatments:  Continue with current un-met Long Term Goals as noted above    ASSESSMENT/RECOMMENDATIONS:  [x]Continue therapy per initial plan/protocol at a frequency of  2-3 x per week for 10 visits  []Continue therapy with the following recommended changes:_____________________      _____________________________________________________________________  []Discontinue therapy progressing towards or have reached established goals  []Discontinue therapy due to lack of appreciable progress towards goals  []Discontinue therapy due to lack of attendance or compliance  []Await Physician's recommendations/decisions regarding therapy  []Other:________________________________________________________________    Thank you for this referral.   Jonatan Becerra, PT 3/13/2019 6:20 PM  NOTE TO PHYSICIAN:  PLEASE COMPLETE THE ORDERS BELOW AND   FAX TO [facility]: [phone]  If you are unable to process this request in 24 hours please contact our office:   [phone]  []  I have read the above report and request that my patient continue as recommended. []  I have read the above report and request that my patient continue therapy with the following changes/special instructions:________________________________________  []I have read the above report and request that my patient be discharged from therapy.     [de-identified] Signature:____________Date:_________TIME:________    [employer], Date and Time must be completed for valid certification **

## 2019-03-18 ENCOUNTER — HOSPITAL ENCOUNTER (OUTPATIENT)
Dept: PHYSICAL THERAPY | Age: 29
Discharge: HOME | End: 2019-03-18
Payer: COMMERCIAL

## 2019-03-18 PROCEDURE — 97110 THERAPEUTIC EXERCISES: CPT

## 2019-03-18 PROCEDURE — 97530 THERAPEUTIC ACTIVITIES: CPT

## 2019-03-18 NOTE — PROGRESS NOTES
PT DAILY TREATMENT NOTE 10-18    Patient Name: Everett Kemp  Date:3/18/2019  : 1990  [x]  Patient  Verified  Payor: Rishabh Bean / Plan: Karely Yoon RPN / Product Type: Commerical /    In time:4:32  Out time:5:42  Total Treatment Time (min): 60  Visit #: 1 of 10    Medicare/BCBS Only   Total Timed Codes (min):    1:1 Treatment Time:          Treatment Area: Low back pain [M54.5]    SUBJECTIVE  Pain Level (0-10 scale): 2/10  Any medication changes, allergies to medications, adverse drug reactions, diagnosis change, or new procedure performed?: [x] No    [] Yes (see summary sheet for update)  Subjective functional status/changes:   [] No changes reported  \"My pelvic floor therapist dry needled my rectus femoris and afterwards it put me back in alignment. \"  OBJECTIVE      35 min Therapeutic Exercise:  [] See flow sheet :    Rationale: increase ROM, increase strength and improve coordination to improve the patients ability to perform more activity   without added pain. 25 min Therapeutic Activity:  [x]  See flow sheet :   Rationale: increase ROM, increase strength and improve coordination  to improve the patients ability to ambulate moderate distance without difficulty        With   [x] TE   [] TA   [] neuro   [] other: Patient Education: [x] Review HEP    [] Progressed/Changed HEP based on:   [] positioning   [] body mechanics   [] transfers   [] heat/ice application    [] other:      Other Objective/Functional Measures:     Pain Level (0-10 scale) post treatment: 2/10    ASSESSMENT/Changes in Function: Pt was able to ambulate on TM for one mile without increased pain. Pt continues to have muscle tightness, but alignment WNLs.     Patient will continue to benefit from skilled PT services to modify and progress therapeutic interventions, address functional mobility deficits, address ROM deficits, address strength deficits, analyze and address soft tissue restrictions and analyze and cue movement patterns to attain remaining goals. []  See Plan of Care  []  See progress note/recertification  []  See Discharge Summary         Progress towards goals / Updated goals:  Long Term Goals: To be accomplished in 10 treatments:  1.  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  Pt will demonstrate good pelvic mobility upon arrival to therapy session to show good core stability to tolerate changing positions without difficulty                Eval Status:  Initiated              OLFNEMX Status: MET. Alignment WNLs today. 3/18/19  4.  Pt will will ambulate 1 mile on TM with good tolerance and no added pain to return to walking for exercise                Eval Status:  Initiated              QSCQQNR Status: MET. Pt was able to ambulate 1 mile without increased pain.  3/18/19  5.  Pt will improve FOTO score to 72 in 10 visits to show significant improvement in function for progress to little difficulty performing usual work, activities and hobbies              Eval Status: 53              Current Status: Progressing at 61 3/13/19          PLAN  [x]  Upgrade activities as tolerated     [x]  Continue plan of care  []  Update interventions per flow sheet       []  Discharge due to:_  []  Other:_      Cortez Desai, PRESLEY 3/18/2019  5:17 PM    Future Appointments   Date Time Provider Kevin Newberry   3/22/2019  7:00 AM Lili Herrera, [address] [place] [address]   3/26/2019  4:30 PM Suzie Dhillon, PT [facility]   3/28/2019  4:00 PM Suzie Dhillon PT [facility]   3/29/2019  6:30 AM Lili Herrera PT [address] [place] [address]   4/22/2019  2:30 PM Blanche Mari MD [address] [place] [address]

## 2019-03-21 ENCOUNTER — APPOINTMENT (OUTPATIENT)
Dept: PHYSICAL THERAPY | Age: 29
End: 2019-03-21
Payer: COMMERCIAL

## 2019-03-26 ENCOUNTER — HOSPITAL ENCOUNTER (OUTPATIENT)
Dept: PHYSICAL THERAPY | Age: 29
Discharge: HOME | End: 2019-03-26
Payer: COMMERCIAL

## 2019-03-26 PROCEDURE — 97110 THERAPEUTIC EXERCISES: CPT

## 2019-03-26 PROCEDURE — 97140 MANUAL THERAPY 1/> REGIONS: CPT

## 2019-03-26 NOTE — PROGRESS NOTES
PT DAILY TREATMENT NOTE 10-18    Patient Name: Freya Davis  Date:3/26/2019  : 1990  [x]  Patient  Verified  Payor: Bairon Mooney / Plan: Federica Simmons RPN / Product Type: Commerical /    In time:4:35  Out time:5:45  Total Treatment Time (min): 80  Visit #: 2 of 10    Treatment Area: Low back pain [M54.5]    SUBJECTIVE  Pain Level (0-10 scale):  2  Any medication changes, allergies to medications, adverse drug reactions, diagnosis change, or new procedure performed?: [x] No    [] Yes (see summary sheet for update)  Subjective functional status/changes:   [] No changes reported  Sore in the buttocks and (B) quads    OBJECTIVE    Modality rationale: decrease inflammation, decrease pain and increase tissue extensibility to improve the patients ability to tolerate increased activity   Min Type Additional Details    [] Estim:  []Unatt       []IFC  []Premod                        []Other:  []w/ice   []w/heat  Position:  Location:    [] Estim: []Att    []TENS instruct  []NMES                    []Other:  []w/US   []w/ice   []w/heat  Position:  Location:    []  Traction: [] Cervical       []Lumbar                       [] Prone          []Supine                       []Intermittent   []Continuous Lbs:  [] before manual  [] after manual    []  Ultrasound: []Continuous   [] Pulsed                           []1MHz   []3MHz W/cm2:  Location:    []  Iontophoresis with dexamethasone         Location: [] Take home patch   [] In clinic   10 [x]  Ice     []  heat  []  Ice massage  []  Laser   []  Anodyne Position: Prone  Location: Buttocks    []  Laser with stim  []  Other:  Position:  Location:    []  Vasopneumatic Device Pressure:       [] lo [] med [] hi   Temperature: [] lo [] med [] hi   [x] Skin assessment post-treatment:  [x]intact []redness- no adverse reaction    []redness - adverse reaction:     30 min Therapeutic Exercise:  [x] See flow sheet : Pt education with regard to current Chief Complaint   Rationale: increase ROM, increase strength and improve coordination to improve the patients ability to return to normal    40 min Manual Therapy:  DN   Rationale: decrease pain, increase ROM and decrease trigger points to return to normal  Dry Needling Procedure Note    Procedure: An intramuscular manual therapy (dry needling) and a neuro-muscular re-education treatment was done to deactivate myofascial trigger points with a 30 gauge filament needle under aseptic technique. Indications:  [x] Myofascial pain and dysfunction [] Muscled spasms  [] Myalgia/myositis   [] Muscle cramps  [] Muscle imbalances  [] Other:    Chart reviewed for the following:  Maci RICHARDSON PT, have reviewed the medical history, summary list and precautions/contraindications for Rene Shaikh.   TIME OUT performed immediately prior to start of procedure:  Maci RICHARDSON PT, have performed the following reviews on Rene Shaikh prior to the start of the session:      [x] Verified patient identification by name and date of birth    [x] Agreement on all muscles being treated was verified   [x] Purpose of dry needling, side effects, possible complications, risks and benefits were explained to the patient   [x] Procedure site(s) verified  [x] Patient was positioned for comfort and draped for privacy  [x] Informed Consent was signed (initial visit) and verified verbally (subsequent visits)  [x] Patient was instructed on the need to report the use of blood thinners and/or immunosuppressant medications  [x] How to respond to possible adverse effects of treatment  [x] Self treatment of post needling soreness: ice, heat (moist heat, heat wraps) and stretching  [x] Opportunity was given to ask any questions, all questions were answered            Time: 5:35  Date of procedure: 3/26/2019    Treatment: The following muscles were treated today with intramuscular dry needling  [] Left [] Right Abdominals: Ant Rectus Abdominis  [] Left [] Right External Oblique / Internal Oblique / Transverse Abdominis  [] Left [] Right Thoracic Multifidi / Rotatores  [] Left [] Right Iliocostalis Thoracis / Lumborum  [] Left [] Right Longissimus Thoracis / Lumborum  [] Left [] Right Lumbar Multifidi  [] Left [] Right Serratus Posterior Inferior  [] Left [] Right Quadratus Lumborum  [] Left [] Right Psoas  [] Left [] Right Iliacus  [] Left [] Right Iliopsoas (inguinal)  [] Left [] Right Piriformis  [] Left [] Right Quadratus Femoris  [x] Left [x] Right [place] / Zain Lo / Keven  [] Left [] Right Obturator Internus  [] Left [] Right Obturator Externus / Marcellina Soho / Inferior Gemellus    [] Left [] Right Tensor Fasciae Lilian  [] Left [] Right Iliotibial Band  [] Left [] Right Pectineus  [] Left [] Right Sartorius  [] Left [] Right Gracilis  [] Left [] Right Adductor Brevis / Adductor Longus / Adductor Jozef  [] Left [] Right Rectus Femoris / Vastus Lateralis / Vastus Intermedius / Vastus Medialis Obliquus  [] Left [] Right Tibialis Anterior / Posterior  [] Left [] Right Extensor Digitorum Longus / Brevis  [] Left [] Right Extensor Hallucis Longus / Brevis  [] Left [] Right Hamstrings: Biceps Femoris / Florinda Mower / Semimembranosis  [] Left [] Right Popliteus / Planteris  [] Left [] Right Gastrocnemius Medial / Lateral  [] Left [] Right Soleus  [] Left [] Right Peronei: Longus / Glenice Tam / Tertius  [] Left [] Right Flexor Digitorum Longus / Brevis  [] Left [] Right Flexor Hallucis Longus / Brevis  [] Left [] Right Quadratus Plantae  [] Left [] Right Abductor Digiti Minimi  [] Left [] Right Foot Interossei  [] Left [] Right Other:    Patient's response to today's treatment:  [x] Latent Twitch Response  [x] Muscle relaxation [x] Pain Relief  [x] Post needling soreness [x] without complications  [] Increased Range of Motion  [] Other:     Performed by: Zacarias Elmore PT          With   [x] TE   [] TA   [] neuro   [] other: Patient Education: [x] Review HEP    [] Progressed/Changed HEP based on:   [] positioning   [] body mechanics   [] transfers   [] heat/ice application    [] other:      Other Objective/Functional Measures:   - MMT knee flx (B) 4+/5 Ext 5/5, Hip Flx Right 4, Left 4  - Good (B) Multifidus initiation and low back strength  - TTP (B) lateral quad and region of Glute medius/minimus/jamel     Pain Level (0-10 scale) post treatment: 2    ASSESSMENT/Changes in Function:   - Good tolerance to DN    Patient will continue to benefit from skilled PT services to modify and progress therapeutic interventions, address functional mobility deficits, address ROM deficits, address strength deficits, analyze and address soft tissue restrictions and analyze and cue movement patterns to attain remaining goals. []  See Plan of Care  []  See progress note/recertification  []  See Discharge Summary         Progress towards goals / Updated goals:  Long Term Goals: To be accomplished in 10 treatments:  1.  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  Pt will demonstrate good pelvic mobility upon arrival to therapy session to show good core stability to tolerate changing positions without difficulty                Eval Status:  Initiated              MZZDGDG Status: MET. Alignment WNLs today. 3/18/19  4.  Pt will will ambulate 1 mile on TM with good tolerance and no added pain to return to walking for exercise                Eval Status:  Initiated              OOBXYDZ Status: MET. Pt was able to ambulate 1 mile without increased pain.  3/18/19  5.  Pt will improve FOTO score to 72 in 10 visits to show significant improvement in function for progress to little difficulty performing usual work, activities and hobbies              Eval Status: 53              Current Status: Progressing at 61 3/13/19      PLAN  [x]  Upgrade activities as tolerated     [x]  Continue plan of care  []  Update interventions per flow sheet       []  Discharge due to:_  []  Other:_      Ze Goldman, PT 3/26/2019  4:55 PM    Future Appointments   Date Time Provider Kevin Newberry   3/28/2019  4:00 PM Johanna Brewer, [place] [facility]   3/29/2019  6:30 AM Lisandra Emerson, [place] [facility]   4/2/2019  4:30 PM Johanna Brewer, PT [facility]   4/4/2019  4:30 PM Johanna Brewer, PT [facility]   4/8/2019  4:30 PM Johanna Brewer, PT [facility]   4/10/2019  4:30 PM Johanna Brewer, PT [facility]   4/15/2019  4:30 PM Johanna Brewer, PT [facility]   4/17/2019  4:30 PM Johanna Brewer, PT [facility]   4/22/2019  2:30 PM Timothy Devine MD [facility]   4/23/2019  4:30 PM Johanna Brewer, PT [facility]   4/25/2019  4:30 PM Johanna Brewer, PT [facility]   4/29/2019  4:30 PM Johanna Brewer, PT [facility]   5/1/2019  4:30 PM Johanna Brewer, PT [facility]   5/6/2019  6:30 AM Lisandra Emerson, PT [facility] SCHED   5/17/2019  6:30 AM Lisandra Emerson, PT [facility] SCHED   5/22/2019  6:30 AM Lisandra Emerson, PT [facility]   5/31/2019  6:30 AM Demetrice Garcia, PT [facility]

## 2019-03-28 ENCOUNTER — HOSPITAL ENCOUNTER (OUTPATIENT)
Dept: PHYSICAL THERAPY | Age: 29
Discharge: HOME | End: 2019-03-28
Payer: COMMERCIAL

## 2019-03-28 PROCEDURE — 97110 THERAPEUTIC EXERCISES: CPT

## 2019-03-28 PROCEDURE — 97140 MANUAL THERAPY 1/> REGIONS: CPT

## 2019-03-28 NOTE — PROGRESS NOTES
PT DAILY TREATMENT NOTE 10-18    Patient Name: Mike Reza  Date:3/28/2019  : 1990  [x]  Patient  Verified  Payor: Tiffanie Hayden / Plan: Tiffanie Whitaker RPN / Product Type: Commerical /    In time:4:17  Out time:5:23  Total Treatment Time (min): 65  Visit #: 3 of 10    Treatment Area: Low back pain [M54.5]    SUBJECTIVE  Pain Level (0-10 scale): 2.5  Any medication changes, allergies to medications, adverse drug reactions, diagnosis change, or new procedure performed?: [x] No    [] Yes (see summary sheet for update)  Subjective functional status/changes:   [] No changes reported  Legs are feeling better just sore from the needles    OBJECTIVE      40 min Therapeutic Exercise:  [x] See flow sheet :   Rationale: increase ROM, increase strength and improve coordination to improve the patients ability to tolerate more activity    25 min Manual Therapy:  STM/DTM (B) Superior Glute region   Rationale: decrease pain, increase ROM, increase tissue extensibility and decrease trigger points to perform increased ADL      With   [] TE   [] TA   [] neuro   [] other: Patient Education: [x] Review HEP    [] Progressed/Changed HEP based on:   [] positioning   [] body mechanics   [] transfers   [] heat/ice application    [] other:      Other Objective/Functional Measures:   - TTP with muscle tightness and TrP (B) Glute however, at a lesser degree     Pain Level (0-10 scale) post treatment: 2    ASSESSMENT/Changes in Function:   - Decreased muscle tightness from last visitw with decreased TrP  - - Good response to Dry Needling    Patient will continue to benefit from skilled PT services to modify and progress therapeutic interventions, address functional mobility deficits, address ROM deficits, address strength deficits, analyze and address soft tissue restrictions and analyze and cue movement patterns to attain remaining goals.      []  See Plan of Care  []  See progress note/recertification  []  See Discharge Summary Progress towards goals / Updated goals:  Long Term Goals: To be accomplished in 10 treatments:  1.  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  Pt will demonstrate good pelvic mobility upon arrival to therapy session to show good core stability to tolerate changing positions without difficulty                Eval Status:  Initiated              MBCSKMS Status: MET. Alignment WNLs today. 3/18/19  4.  Pt will will ambulate 1 mile on TM with good tolerance and no added pain to return to walking for exercise                Eval Status:  Initiated              SMSRKNT Status: MET. Pt was able to ambulate 1 mile without increased pain.  3/18/19  5.  Pt will improve FOTO score to 72 in 10 visits to show significant improvement in function for progress to little difficulty performing usual work, activities and hobbies              Eval Status: 53              Current Status: Progressing at 61 3/13/19        PLAN  [x]  Upgrade activities as tolerated     [x]  Continue plan of care  []  Update interventions per flow sheet       []  Discharge due to:_  []  Other:_      Abdulkadir Landin, PT 3/28/2019  5:02 PM    Future Appointments   Date Time Provider Kevin Newberry   3/29/2019  6:30 AM Alex De La Rosa, PT [facility]   4/2/2019  4:30 PM Nate Greenfield, PT [facility]   4/4/2019  4:30 PM Nate Greenfield PT [facility]   4/8/2019  4:30 PM Nate Greenfield PT [facility]   4/10/2019  4:30 PM Nate Greenfield PT [facility]   4/15/2019  4:30 PM Nate Greenfield PT [facility]   4/17/2019  4:30 PM Nate Greenfield, PT [facility]   4/22/2019  2:30 PM Meir Aguilar MD [facility]   4/23/2019  4:30 PM Nate Greenfield, PT [facility]   4/25/2019  4:30 PM Nate Greenfield, PT [facility]   4/29/2019 4:30 PM Idris Ashton [facility]   5/1/2019  4:30 PM Nelly Kemp, PT [facility]   5/6/2019  6:30 AM Elise Lefort, PT [facility]   5/17/2019  6:30 AM Elise Lefort, PT [facility]   5/22/2019  6:30 AM Elise Lefort, PT [facility]   5/31/2019  6:30 AM Zuleima Howard, Doe Clifford, PT [facility]

## 2019-04-02 ENCOUNTER — HOSPITAL ENCOUNTER (OUTPATIENT)
Dept: PHYSICAL THERAPY | Age: 29
Discharge: HOME | End: 2019-04-02
Payer: COMMERCIAL

## 2019-04-02 PROCEDURE — 97110 THERAPEUTIC EXERCISES: CPT

## 2019-04-02 PROCEDURE — 97140 MANUAL THERAPY 1/> REGIONS: CPT

## 2019-04-02 NOTE — PROGRESS NOTES
PT DAILY TREATMENT NOTE 10-18    Patient Name: Aisha Brooks  Date:2019  : 1990  [x]  Patient  Verified  Payor: Gloria Cantrell / Plan: Familia MARISCAL / Product Type: Commerical /    In time:4:36 Out time: 5:38  Total Treatment Time (min): 54  Visit #: 4 of 10    Treatment Area: Low back pain [M54.5]    SUBJECTIVE  Pain Level (0-10 scale):  2  Any medication changes, allergies to medications, adverse drug reactions, diagnosis change, or new procedure performed?: [x] No    [] Yes (see summary sheet for update)  Subjective functional status/changes:   [] No changes reported  Legs are feeling better just sore from the needles    OBJECTIVE      44 min Therapeutic Exercise:  [x] See flow sheet :   Rationale: increase ROM, increase strength and improve coordination to improve the patients ability to tolerate more activity    10 min Manual Therapy:  STM/DTM (B) Superior Glute region   Rationale: decrease pain, increase ROM, increase tissue extensibility and decrease trigger points to perform increased ADL    6 min Therapeutic Activity:  []  See flow sheet :  TM Ambulation   Rationale: increase ROM, increase strength and improve coordination  to improve the patients ability to return to normal walking    With   [x] TE   [] TA   [] neuro   [] other: Patient Education: [x] Review HEP    [] Progressed/Changed HEP based on:   [] positioning   [] body mechanics   [] transfers   [] heat/ice application    [] other:      Other Objective/Functional Measures:       Pain Level (0-10 scale) post treatment: 2    ASSESSMENT/Changes in Function:   - Decreased muscle tightness and TTP  - Pt reports increased satisfaction with current outcome  - TM ambulation 1/4 mile in 5'45\" with minor left lateral thigh pain    Patient will continue to benefit from skilled PT services to modify and progress therapeutic interventions, address functional mobility deficits, address ROM deficits, address strength deficits, analyze and address soft tissue restrictions and analyze and cue movement patterns to attain remaining goals. []  See Plan of Care  []  See progress note/recertification  []  See Discharge Summary         Progress towards goals / Updated goals:  Long Term Goals: To be accomplished in 10 treatments:  1.  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  Pt will demonstrate good pelvic mobility upon arrival to therapy session to show good core stability to tolerate changing positions without difficulty                Eval Status:  Initiated              HWUSCUY Status: MET. Alignment WNLs today. 3/18/19  4.  Pt will will ambulate 1 mile on TM with good tolerance and no added pain to return to walking for exercise                Eval Status:  Initiated              PUHMIGQ Status: MET. Pt was able to ambulate 1 mile without increased pain.  3/18/19  5.  Pt will improve FOTO score to 72 in 10 visits to show significant improvement in function for progress to little difficulty performing usual work, activities and hobbies              Eval Status: 53              Current Status: Progressing at 61 3/13/19        PLAN  [x]  Upgrade activities as tolerated     [x]  Continue plan of care  []  Update interventions per flow sheet       []  Discharge due to:_  []  Other:_      Trevor Boudreaux PT 4/2/2019  5:11 PM    Future Appointments   Date Time Provider Kevin Newberry   4/4/2019  4:30 PM Nelly Kemp PT [facility]   4/8/2019  4:30 PM Nelly Kemp PT [facility]   4/10/2019  4:30 PM Nelly Kemp PT [facility]   4/12/2019  7:00 AM Jocelynn Hager    4/15/2019  4:30 PM Nelly Kemp PT [facility]   4/17/2019  4:30 PM [facility]   4/22/2019  2:30 PM Mady Sevilla MD [address] SCHED   4/23/2019  4:30 PM Galina Harriser, PT [facility]   4/25/2019  4:30 PM Galina Commander, PT [facility]   4/29/2019  4:30 PM Galina Harriser, PT [facility]   5/1/2019  4:30 PM Galina Harriser, PT [facility]   5/6/2019  6:30 AM Fernando Boudreaux, PT [facility] SCHED   5/17/2019  6:30 AM Fernando Boudreaux, PT [facility]   5/22/2019  6:30 AM Fernando Boudreaux, PT [facility]   5/31/2019  6:30 AM Roly Price, PT [facility]

## 2019-04-04 ENCOUNTER — HOSPITAL ENCOUNTER (OUTPATIENT)
Dept: PHYSICAL THERAPY | Age: 29
Discharge: HOME | End: 2019-04-04
Payer: COMMERCIAL

## 2019-04-04 PROCEDURE — 97110 THERAPEUTIC EXERCISES: CPT

## 2019-04-04 NOTE — PROGRESS NOTES
PT DAILY TREATMENT NOTE 10-18    Patient Name: Galen Gutierrez  Date:2019  : 1990  [x]  Patient  Verified  Payor: Symone Shabazz / Plan: Maykel Whitten RPN / Product Type: Commerical /    In time:4:37 Out time: 5:46  Total Treatment Time (min): 71  Visit #: 5 of 10    Treatment Area: Low back pain [M54.5]    SUBJECTIVE  Pain Level (0-10 scale): 4  Any medication changes, allergies to medications, adverse drug reactions, diagnosis change, or new procedure performed?: [x] No    [] Yes (see summary sheet for update)  Subjective functional status/changes:   [] No changes reported  Pain Right Groin and left glute    OBJECTIVE    Modality rationale: decrease inflammation, decrease pain and increase tissue extensibility to improve the patients ability to tolerate normal activity   Min Type Additional Details    [] Estim:  []Unatt       []IFC  []Premod                        []Other:  []w/ice   []w/heat  Position:  Location:    [] Estim: []Att    []TENS instruct  []NMES                    []Other:  []w/US   []w/ice   []w/heat  Position:  Location:    []  Traction: [] Cervical       []Lumbar                       [] Prone          []Supine                       []Intermittent   []Continuous Lbs:  [] before manual  [] after manual    []  Ultrasound: []Continuous   [] Pulsed                           []1MHz   []3MHz Location:  W/cm2:    []  Iontophoresis with dexamethasone         Location: [] Take home patch   [] In clinic   10 [x]  Ice     []  heat  []  Ice massage  []  Laser   []  Anodyne Position:  Location:    []  Laser with stim  []  Other: Position:  Location:    []  Vasopneumatic Device Pressure:       [] lo [] med [] hi   Temperature: [] lo [] med [] hi   [x] Skin assessment post-treatment:  [x]intact []redness- no adverse reaction    []redness - adverse reaction:     59 min Therapeutic Exercise:  [x] See flow sheet :   Rationale: increase ROM, increase strength and improve coordination to improve the patients ability to tolerate more activity    With   [x] TE   [] TA   [] neuro   [] other: Patient Education: [x] Review HEP    [] Progressed/Changed HEP based on:   [] positioning   [] body mechanics   [] transfers   [] heat/ice application    [] other:      Other Objective/Functional Measures:  - Elev Right crest  - Decr mobility (B) Innominate in stork stance       Pain Level (0-10 scale) post treatment: 3    ASSESSMENT/Changes in Function:   - Increased mobility (B) Innominate in stork stance after initial exercises  Patient will continue to benefit from skilled PT services to modify and progress therapeutic interventions, address functional mobility deficits, address ROM deficits, address strength deficits, analyze and address soft tissue restrictions and analyze and cue movement patterns to attain remaining goals. []  See Plan of Care  []  See progress note/recertification  []  See Discharge Summary         Progress towards goals / Updated goals:  Long Term Goals: To be accomplished in 10 treatments:  1.  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  Pt will demonstrate good pelvic mobility upon arrival to therapy session to show good core stability to tolerate changing positions without difficulty                Eval Status:  Initiated              ZUQEIUX Status: MET. Alignment WNLs today. 3/18/19  4.  Pt will will ambulate 1 mile on TM with good tolerance and no added pain to return to walking for exercise                Eval Status:  Initiated              SGIWDCF Status: MET. Pt was able to ambulate 1 mile without increased pain.  3/18/19  5.  Pt will improve FOTO score to 72 in 10 visits to show significant improvement in function for progress to little difficulty performing usual work, activities and sharon              Eval Status: 53              GFEWZNS Status: Progressing at 61 3/13/19        PLAN  [x]  Upgrade activities as tolerated     [x]  Continue plan of care  []  Update interventions per flow sheet       []  Discharge due to:_  []  Other:_      Torrey He, PT 4/4/2019  5:11 PM    Future Appointments   Date Time Provider Kevin Newberry   4/8/2019  4:30 PM Maryhelen Burkitt, PT [facility]   4/10/2019  4:30 PM Maryhelen Burkitt, PT [facility]   4/12/2019  7:00 AM DIANE Brunner   4/15/2019  4:30 PM Maryhelen Burkitt, PT [facility]   4/17/2019  4:30 PM Maryhelen Burkitt, PT [facility]   4/22/2019  2:30 PM MD María Vu   4/23/2019  4:30 PM Maryhelen Burkitt, PT [facility]   4/25/2019  4:30 PM Maryhelen Burkitt, PT [facility]   4/29/2019  4:30 PM Maryhelen Burkitt, PT [facility]   5/1/2019  4:30 PM Maryhelen Burkitt, PT [facility]   5/6/2019  6:30 AM Kayla Weber, PT [facility] SCHED   5/17/2019  6:30 AM Kayla Weber, PT [facility] SCHED   5/22/2019  6:30 AM Kayla Weber, DIANE MCKENNA   5/31/2019  6:30 AM Daly Hernández, DIANE Daniel

## 2019-04-08 ENCOUNTER — APPOINTMENT (OUTPATIENT)
Dept: PHYSICAL THERAPY | Age: 29
End: 2019-04-08
Payer: COMMERCIAL

## 2019-04-10 ENCOUNTER — APPOINTMENT (OUTPATIENT)
Dept: PHYSICAL THERAPY | Age: 29
End: 2019-04-10
Payer: COMMERCIAL

## 2019-04-15 ENCOUNTER — APPOINTMENT (OUTPATIENT)
Dept: PHYSICAL THERAPY | Age: 29
End: 2019-04-15
Payer: COMMERCIAL

## 2019-04-17 ENCOUNTER — APPOINTMENT (OUTPATIENT)
Dept: PHYSICAL THERAPY | Age: 29
End: 2019-04-17
Payer: COMMERCIAL

## 2019-04-23 ENCOUNTER — APPOINTMENT (OUTPATIENT)
Dept: PHYSICAL THERAPY | Age: 29
End: 2019-04-23
Payer: COMMERCIAL

## 2019-04-25 ENCOUNTER — APPOINTMENT (OUTPATIENT)
Dept: PHYSICAL THERAPY | Age: 29
End: 2019-04-25
Payer: COMMERCIAL

## 2019-04-29 ENCOUNTER — APPOINTMENT (OUTPATIENT)
Dept: PHYSICAL THERAPY | Age: 29
End: 2019-04-29
Payer: COMMERCIAL

## 2019-05-01 ENCOUNTER — HOSPITAL ENCOUNTER (OUTPATIENT)
Dept: PHYSICAL THERAPY | Age: 29
Discharge: HOME | End: 2019-05-01
Payer: COMMERCIAL

## 2019-05-01 PROCEDURE — 97140 MANUAL THERAPY 1/> REGIONS: CPT

## 2019-05-01 PROCEDURE — 97110 THERAPEUTIC EXERCISES: CPT

## 2019-05-01 PROCEDURE — 97035 APP MDLTY 1+ULTRASOUND EA 15: CPT

## 2019-05-01 NOTE — PROGRESS NOTES
[facility] at [address] Phone: [phone]      Fax:  [phone] Progress Note Patient name: Shana Breen Start of Care: 2019 Referral source: Geoff Santiago MD : 1990 Medical Diagnosis: Low back pain [M54.5] Payor: LAUREN / Plan: [facility] [employer] OTHER / Product Type: PPO /  Onset Date:2018with recent exaccerbations and more frequent pain Treatment Diagnosis: Pelvic obliquity, Neural tension (B) LE in the sciatic nerve pattern Prior Hospitalization: see medical history Provider#: [id number] Medications: Verified on Patient summary List  
 Comorbidities: Pelvic Floor Dysfunction Prior Level of Function: walk 3 times a day for a mile and a half with pain 2-3 times a week Visits from Start of Care: 15    Missed Visits: 0 Established Goals:          Excellent Good         Limited           None 
[] Increased ROM   []  [x]  []  [] 
[] Increased Strength  []  [x]  []  [] 
[] Increased Mobility  []  [x]  []  []  
[] Decreased Pain   []  [x]  []  [] 
[] Decreased Swelling  []  []  []  [] 
 
Key Functional Changes: Patient has shown good progress with this treatment program. Pain as of last visit was 2/10 in the left glute. Patient has shown decreased pain and increased strength and mobility. Patient reports improvement with overall involvement. FOTO score is 63. All STG/LTGs achieved as identified below. Fall Risk Assessment: Patient demonstrates no Fall Risk Progress towards goals / Updated goals: 
Long Term Goals: To be accomplished in 10 treatments: 1.  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  
            Eval Status:  Initiated 
            Current Status:  Progressing with 15# with good tolerance 19 3.  Pt will demonstrate good pelvic mobility upon arrival to therapy session to show good core stability to tolerate changing positions without difficulty   
            Eval Status:  Initiated 
            YVYQDVN Status: MET. Alignment WNLs today. 3/18/19 4.  Pt will will ambulate 1 mile on TM with good tolerance and no added pain to return to walking for exercise   
            Eval Status:  Initiated 
            UUQYZZC Status: MET. Pt was able to ambulate 1 mile without increased pain. 3/18/19 5.  Pt will improve FOTO score to 72 in 10 visits to show significant improvement in function for progress to little difficulty performing usual work, activities and hobbies             Eval Status: 53 
            MGHJYFJ Status: Progressing at 61 3/13/19 
   
Updated Goals: to be achieved in 10 treatments: 1.  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  
            Status at last note/certification: Progressing with 15# with good tolerance Current Status: 2.  Pt will reports the ability to complete a full round of golf without needing to stop due to excess fatigue in the LE or gluteus muscles to return to normal activity Status at last note/certification: Able to play 9 holes before stopping due to fatigue and pain Current Status: 3.  Pt will demonstrate TM ambulation for 1/2 mile with little fatigue for carryover to increased tolerance to usual activity without added fatigue or muscle burning Status at last note/certification: Amb 7'34\" for 1/4 mile Current Status:     
        
ASSESSMENT/RECOMMENDATIONS: 
[x]Continue therapy per initial plan/protocol at a frequency of  2-3 x per week for 6 visits []Continue therapy with the following recommended changes:_____________________      _____________________________________________________________________ []Discontinue therapy progressing towards or have reached established goals []Discontinue therapy due to lack of appreciable progress towards goals []Discontinue therapy due to lack of attendance or compliance []Await Physician's recommendations/decisions regarding therapy []Other:________________________________________________________________ Thank you for this referral.  
Leoma Brittle, PT 5/1/2019 5:43 PM 
NOTE TO PHYSICIAN:  PLEASE COMPLETE THE ORDERS BELOW AND  
FAX TO [facility]: [phone] If you are unable to process this request in 24 hours please contact our office:  
[phone] []  I have read the above report and request that my patient continue as recommended. []  I have read the above report and request that my patient continue therapy with the following changes/special instructions:________________________________________ []I have read the above report and request that my patient be discharged from therapy.  
 
[de-identified] Signature:____________Date:_________TIME:________ 
 
[facility], Date and Time must be completed for valid certification **

## 2019-05-01 NOTE — PROGRESS NOTES
PT DAILY TREATMENT NOTE 10-18 Patient Name: [employer] Date:2019 : 1990 [x]  Patient  Verified Payor: Danni Life / Plan: Nan Tucker RPN / Product Type: Commerical / In time:4:31  Out time:5:09 Total Treatment Time (min): 38 Visit #: 1   of  6 Medicare/BCBS Only Total Timed Codes (min):    1:1 Treatment Time:     
 
 
Treatment Area: Low back pain [M54.5] SUBJECTIVE Pain Level (0-10 scale): 2 Any medication changes, allergies to medications, adverse drug reactions, diagnosis change, or new procedure performed?: [x] No    [] Yes (see summary sheet for update) Subjective functional status/changes:   [] No changes reported Received Prolo therapy and it seems to help. Pain is at the left superior glute. Progressing at 70% improvement OBJECTIVE Modality rationale: decrease inflammation, decrease pain and increase tissue extensibility to improve the patients ability to return to normal  
Min Type Additional Details  
 [] Estim:  []Unatt       []IFC  []Premod []Other:  []w/ice   []w/heat Position: Location:  
 [] Estim: []Att    []TENS instruct  []NMES []Other:  []w/US   []w/ice   []w/heat Position: Location:  
 []  Traction: [] Cervical       []Lumbar 
                     [] Prone          []Supine []Intermittent   []Continuous Lbs: 
[] before manual 
[] after manual  
8 [x]  Ultrasound: []Continuous   [] Pulsed []1MHz   []3MHz W/cm2: 
Location:  
 []  Iontophoresis with dexamethasone Location: [] Take home patch  
[] In clinic  
 []  Ice     []  heat 
[]  Ice massage 
[]  Laser  
[]  Anodyne Position: Location:  
 []  Laser with stim 
[]  Other:  Position: Location:  
 []  Vasopneumatic Device Pressure:       [] lo [] med [] hi  
Temperature: [] lo [] med [] hi  
[x] Skin assessment post-treatment:  [x]intact []redness- no adverse reaction []redness  adverse reaction:  
 
10 min Therapeutic Exercise:  [] See flow sheet :  
Rationale: increase ROM, increase strength and improve coordination to improve the patients ability to do normal activity without pain 12 min Manual Therapy:  STM/DTM/Effleurage left Superior glute Rationale: decrease pain, increase ROM, increase tissue extensibility and decrease trigger points to tolerate normal activity With 
 [x] TE 
 [] TA 
 [] neuro 
 [] other: Patient Education: [x] Review HEP [] Progressed/Changed HEP based on:  
[] positioning   [] body mechanics   [] transfers   [] heat/ice application   
[] other:   
 
Other Objective/Functional Measures:  
- Good pelvic alignment and mobility - Pt Goal: Be able to complete a full round of golf and be more physically active without being so fatigued with muscles huring Pain Level (0-10 scale) post treatment: 0 
 
ASSESSMENT/Changes in Function: - Improved mobility and decreased pain after treatment 
- good response to STM and US at left buttocks Patient will continue to benefit from skilled PT services to modify and progress therapeutic interventions, address functional mobility deficits, address ROM deficits, address strength deficits, analyze and address soft tissue restrictions and analyze and cue movement patterns to attain remaining goals. []  See Plan of Care 
[]  See progress note/recertification 
[]  See Discharge Summary Progress towards goals / Updated goals: 
Long Term Goals: To be accomplished in 10 treatments: 1.  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  
            Eval Status:  Initiated 
            Current Status:  Progressing with 15# with good tolerance 4/2/19 3.  Pt will demonstrate good pelvic mobility upon arrival to therapy session to show good core stability to tolerate changing positions without difficulty   
             Eval Status:  Initiated 
            BYJKWSI Status: MET. Alignment WNLs today. 3/18/19 4.  Pt will will ambulate 1 mile on TM with good tolerance and no added pain to return to walking for exercise   
            Eval Status:  Initiated 
            VXSCEJU Status: MET. Pt was able to ambulate 1 mile without increased pain. 3/18/19 5.  Pt will improve FOTO score to 72 in 10 visits to show significant improvement in function for progress to little difficulty performing usual work, activities and hobbies             Eval Status: 53 
            PCGHLAU Status: Progressing at 61 3/13/19 
  
 
 
 
PLAN [x]  Upgrade activities as tolerated     [x]  Continue plan of care 
[]  Update interventions per flow sheet      
[]  Discharge due to:_ 
[]  Other:_ Caterina Magallon, PT 5/1/2019  4:38 PM 
 
Future Appointments Date Time Provider eKvin Newberry 5/6/2019  6:30 AM Donya Killian PT [facility] SCHED  
5/9/2019 11:15 AM Pepe Ho MD [facility] SCHED  
5/17/2019  6:30 AM Donya Killian PT [facility] SCHED  
5/22/2019  6:30 AM Donya Killian PT [facility] SCHED  
5/31/2019  6:30 AM Brian Penaloza, PT [id number]

## 2019-06-17 ENCOUNTER — HOSPITAL ENCOUNTER (OUTPATIENT)
Dept: CT IMAGING | Age: 29
Discharge: HOME | End: 2019-06-17
Attending: UROLOGY
Payer: COMMERCIAL

## 2019-06-17 DIAGNOSIS — N20.0 KIDNEY STONE: ICD-10-CM

## 2019-06-17 PROCEDURE — 74176 CT ABD & PELVIS W/O CONTRAST: CPT

## 2019-07-05 PROBLEM — F41.9 ANXIETY: Status: ACTIVE | Noted: 2019-02-05

## 2019-07-05 PROBLEM — J32.9 CHRONIC SINUSITIS: Status: ACTIVE | Noted: 2019-07-05

## 2019-07-05 PROBLEM — M35.7 HYPERMOBILITY SYNDROME: Status: ACTIVE | Noted: 2019-07-05

## 2019-07-05 PROBLEM — J30.1 ALLERGIC RHINITIS DUE TO POLLEN: Status: ACTIVE | Noted: 2018-04-13

## 2019-07-05 PROBLEM — M54.50 LOW BACK PAIN: Status: ACTIVE | Noted: 2019-07-05

## 2019-07-05 PROBLEM — M53.2X8: Status: ACTIVE | Noted: 2019-07-05

## 2021-07-27 ENCOUNTER — HOSPITAL ENCOUNTER (OUTPATIENT)
Dept: PHYSICAL THERAPY | Age: 31
Discharge: HOME | End: 2021-07-27
Payer: COMMERCIAL

## 2021-07-27 PROCEDURE — 97162 PT EVAL MOD COMPLEX 30 MIN: CPT

## 2021-07-27 PROCEDURE — 97112 NEUROMUSCULAR REEDUCATION: CPT

## 2021-07-27 PROCEDURE — 97530 THERAPEUTIC ACTIVITIES: CPT

## 2021-07-27 NOTE — PROGRESS NOTES
PT DAILY TREATMENT NOTE    Patient Name: Oziel Rosen  Date:2021  : 1990  [x]  Patient  Verified  Payor: BLUE ALEJA / Plan: Dre Mcnair [id number] PPO / Product Type: PPO /    In time:12:25 pm  Out time:1:20 pm   Total Treatment Time (min): 55  Total Timed Codes (min): 30  1:1 Treatment Time ( only): 55   Visit #: 1 of 12    Treatment Dx:  Low back pain [M54.5]  Sacroiliac joint pain [M53.3]    SUBJECTIVE  Pain Level (0-10 scale): 4  Any medication changes, allergies to medications, adverse drug reactions, diagnosis change, or new procedure performed?: [x] No    [] Yes (see summary sheet for update)  Subjective functional status/changes:   [] No changes reported    Hx Present Illness: LBP and SIJ pain   Onset 3 years ago, insidious in onset with chronic infection    Currently seeing pelvic floor therapist 1x month   \"aching in pelvis on the right side, across lumbar spine   Has chronic, long term, low-grade bladder infection for past 3 years - currently seeing PT (naturopath MD in [place])  Pt is getting   In 2022 - has goal of being healthy by March   Increase in pain 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, 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, teaching, 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, wakes up \"stiff\", sitting prolonged period of time, anything for a prolonged period of time    Intermittent numbness tingling into bottoms of bilateral feet - typically lying down, with waking in morning, decreases with activities, very rare     Pain:  _6-7__/10 max       __3_/10 min     _4___/10 now    Location: gluts hips, right anterior hip, across lumbar spine      [] Sharp    [] Dull      [] Burning     [x]  Aching     [] Throbbing      [] Tingling     [x] Other: pelvic floor pain \"pulling and clenching\" and \" feels tight and in a knot\"      []  Constant                   [] Intermittent        Previous treatment:   PT x 9 weeks, dry needling, dry needling with pelvic floor therapist with good results, Pelvic floor PT  Chiropractor - now frequency 1x per month      PMHX: PMHx/Surgical Hx:  chronic, low grade bladder infections     Social/Recreation/Work: Work Hx: 1rst , Brittney Portillo Situation: lives with fiance   Recreational Activities: walk 1 mile, exercise 3 days a week      Patient Goal(s): \"I want to be pain free. \"    Cognition: A & O x 4      OBJECTIVE    25 min [x]Eval                  []Re-Eval        10 min Neuromuscular Re-education:  [x]  See flow sheet :   Rationale: increase ROM, increase strength, improve coordination and increase proprioception  to improve the patients ability to perform daily activities with decreased pain and symptom levels            With   [] TE   [x] TA - 20 min    [] neuro   [] other: Patient Education: [x] Review HEP    [] Progressed/Changed HEP based on:   [] positioning   [] body mechanics   [] transfers   [] heat/ice application    [x] other: pt education regarding exam findings, anatomy involved, SALUD principles, pain science and POC     Other Objective/Functional Measures:    Movement/gait: right shoulder lower, decreased right arm swing      Visual Inspection: Thoracic: flattened  Lumbar: decreased   Shoulder/Scapula: right shoulder lower left scap abducted     Palpation: TTP right QL with increased tone  TTP bilateral gluts and posterior hip left >right                           AROM Right Left   Hip IR 14 13   Hip ER 15 14                         Strength Right Left   Hip Flex 5 5   Knee Ext 5 5   Hamstrings 4 4   Hip IR 3 adductor 3 adductor   Hip ER 3 adductor 3 adductor        Bridging: no pain      Special Tests Right Left   Slump - -   Passive SLR 65-70 60   Gaenslens + - groin and lateral hip -   MOHAN _ - groin -   Obie + hip flex + hip flex          Other Right Left                                       Other Comments: Standing Forward Flexion 7 in from floor, right lateralization, minimal reversal of lordosis   Gillet: dysrhythmia bilaterally, hypomobile bilaterally   SALUD Squat: 1/5    SALUD Special Tests   HGIR:                                                 Right: 72             Left: 76  Hip Add Drop Test:                           Right: +             Left:+  Passive Abduction Raise  Right: +  Left: -  Trunk Rot                                           Right: 15 in    Left 15 in    Shoulder Horizontal Abduction          Right: 40              Left: 23   Extension Drop   Right:-   Left: -    Pain Level (0-10 scale) post treatment: 3    ASSESSMENT/Changes in Function:   Pt is a pleasant and motivated 31 y.o. female with C/C of LBP and SIJ pain. Pt reports 3 year hx of SIJ and LBP coinciding with chronic low grade bladder infection that onset ~3 years ago. Pt is currently being seen 1 visit per month for pelvic floor PT with dry needling. At present current signs/symptoms consistent with lumbo-pelvic dysfunction and SIJ dysfunction. Objective measures include soft tissue restrictions (Right QL, bilateral posterior hip and adductors), decreased hip IR and ER bilaterally, decreased trunk rotation, bilateral glut and hamstring inhibition, postural adaptations and gait adaptations. Pt is active young adult, could benefit from skilled PT to allow to resume active lifestyle without pain or limitation. Patient will continue to benefit from skilled PT services to modify and progress therapeutic interventions, address functional mobility deficits, address ROM deficits, address strength deficits, analyze and address soft tissue restrictions, analyze and cue movement patterns, analyze and modify body mechanics/ergonomics, assess and modify postural abnormalities and instruct in home and community integration to attain remaining goals. [x]  See Plan of Care  []  See progress note/recertification  []  See Discharge Summary         Progress towards goals / Updated goals:  Short Term Goals: STG- To be accomplished in 5 treatment(s):  1.   Pt will be independent with HEP to encourage prophylaxis. Eval:dispensed    Long Term Goals: LTG- To be accomplished in 14 treatment(s):  1. Pt will improve hip adduction drop to negative to indicate improved femoral-acetabular mobility needed for gait. .  Eval:positive bilaterally     2. Pt will improve hip adduction lift test to 4/5 bilaterally to indicate proper pelvic position needed for walking . Eval:0/5 bilaterally    3. Pt will improve SALUD squat to 3/5 or better to indicate LE, pelvic, hip and back mobility needed for daily activities  Eval:SALUD squat 1/5    4. Pt FOTO score will increase by 10 points to show improvement in function. Eval:60  Current: will address at visit 5    5. 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   Eval: tolerance at 45 min         PLAN  [x]  Upgrade activities as tolerated     []  Continue plan of care  []  Update interventions per flow sheet       []  Discharge due to:_  []  Other:_      Jesús Reaves, PT, DPT 7/27/2021  12:08 PM    Future Appointments   Date Time Provider Kevin Newberry   7/27/2021 12:15 PM Gus Girard, PT, DPT [facility]   8/12/2021  7:40 AM Laura Wheeler, PT [facility]   9/1/2021  7:40 AM Gianna Duffy, PT María

## 2021-07-27 NOTE — PROGRESS NOTES
[facility] at [facility] [address], [place], [address] [facility]  Phone: [phone]      Fax:  [phone]             Plan of Care/ Statement of Necessity for Physical Therapy Services      Patient name: Steven Agustin Start of Care: 2021   Referral source: Rosalva Burch MD : 1990    Medical Diagnosis: Low back pain [M54.5]  Sacroiliac joint pain [M53.3]   Onset Date:3 years ago2    Treatment Diagnosis: Lumbopelvic dysfunction    Prior Hospitalization: see medical history Provider#: [id number]   Medications: Verified on Patient summary List    Comorbidities: hx of chronic bladder infection   Prior Level of Function: Increase in pain 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 Assessment/ key information:   Pt is a pleasant and motivated 31 y.o. female with C/C of LBP and SIJ pain. Pt reports 3 year hx of SIJ and LBP coinciding with chronic low grade bladder infection that onset ~3 years ago. Pt is currently being seen 1 visit per month for pelvic floor PT with dry needling. At present current signs/symptoms consistent with lumbo-pelvic dysfunction and SIJ dysfunction. Objective measures include soft tissue restrictions (Right QL, bilateral posterior hip and adductors), decreased hip IR and ER bilaterally, decreased trunk rotation, bilateral glut and hamstring inhibition, postural adaptations and gait adaptations. Pt is active young adult, could benefit from skilled PT to allow to resume active lifestyle without pain or limitation.      Evaluation Complexity History MEDIUM  Complexity : 1-2 comorbidities / personal factors will impact the outcome/ POC ; Examination HIGH Complexity : 4+ Standardized tests and measures addressing body structure, function, activity limitation and / or participation in recreation  ;Presentation MEDIUM Complexity : Evolving with changing characteristics  ; Clinical Decision Making MEDIUM Complexity : FOTO score of 26-74 FOTO score = an established functional score where 100 = no disability  Overall Complexity Rating: MEDIUM  Problem List: pain affecting function, decrease ROM, decrease strength, impaired gait/ balance, decrease ADL/ functional abilitiies, decrease activity tolerance, decrease flexibility/ joint mobility and decrease transfer abilities   Treatment Plan may include any combination of the following: Therapeutic exercise, Therapeutic activities, Neuromuscular re-education, Physical agent/modality, Gait/balance training, Manual therapy and Patient education  Vasopnuematic compression justification:  Per bilateral girth measures taken and listed above the edema is considered significant and having an impact on the patient's self care  Patient / Family readiness to learn indicated by: asking questions, trying to perform skills and interest  Persons(s) to be included in education: patient (P)  Barriers to Learning/Limitations: None  Patient Goal (s): I want to be pain free  Patient Self Reported Health Status: good  Rehabilitation Potential: good    Short Term Goals: STG- To be accomplished in 5 treatment(s):  1. Pt will be independent with HEP to encourage prophylaxis. Eval:dispensed     Long Term Goals: LTG- To be accomplished in 14 treatment(s):  1. Pt will improve hip adduction drop to negative to indicate improved femoral-acetabular mobility needed for gait. .  Eval:positive bilaterally      2. Pt will improve hip adduction lift test to 4/5 bilaterally to indicate proper pelvic position needed for walking . Eval:0/5 bilaterally     3. Pt will improve SALUD squat to 3/5 or better to indicate LE, pelvic, hip and back mobility needed for daily activities  Eval:SALUD squat 1/5     4.   Pt FOTO score will increase by 10 points to show improvement in function. 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 1 time per week for 2 weeks    Patient/ Caregiver education and instruction: Diagnosis, prognosis, self care, activity modification and exercises   [x]  Plan of care has been reviewed with PRESLEY Reaves, PT, DPT 7/27/2021 7:08 PM  _____________________________________________________________________  I certify that the above Therapy Services are being furnished while the patient is under my care. I agree with the treatment plan and certify that this therapy is necessary.     [de-identified] Signature:____________Date:_________TIME:________                                      Jayesh Martins MD    ** Signature, Date and Time must be completed for valid certification **    Please sign and return to [facility] at the [address], [place], [address] [facility]       Phone: [phone]      Fax:  [phone]

## 2021-07-29 ENCOUNTER — HOSPITAL ENCOUNTER (OUTPATIENT)
Dept: PHYSICAL THERAPY | Age: 31
Discharge: HOME | End: 2021-07-29
Payer: COMMERCIAL

## 2021-07-29 PROCEDURE — 97112 NEUROMUSCULAR REEDUCATION: CPT

## 2021-07-29 PROCEDURE — 97140 MANUAL THERAPY 1/> REGIONS: CPT

## 2021-07-29 PROCEDURE — 97530 THERAPEUTIC ACTIVITIES: CPT

## 2021-07-29 NOTE — PROGRESS NOTES
PT DAILY TREATMENT NOTE    Patient Name: Ye Calhoun  Date:2021  : 1990  [x]  Patient  Verified  Payor: Gunner Roche / Plan: Dre Mcnair [id number] PPO / Product Type: PPO /    In time:3:00 pm   Out time:3:59 pm   Total Treatment Time (min): 59  Total Timed Codes (min): 59  1:1 Treatment Time (MC only): 59   Visit #: 2 of 14    Treatment Dx: Low back pain [M54.5]  Sacroiliac joint pain [M53.3]    SUBJECTIVE  Pain Level (0-10 scale): 4-5  Any medication changes, allergies to medications, adverse drug reactions, diagnosis change, or new procedure performed?: [x] No    [] Yes (see summary sheet for update)  Subjective functional status/changes:   [] No changes reported  \"My left side is a little cranky but I think it was from the injection. I have some questions. \"    OBJECTIVE    15 min Therapeutic Activity:  [x]  See flow sheet :answered pt questions regarding POC and evaluation - pelvic positioning, HEP, roll of diaphragm    Rationale: increase ROM, increase strength, improve coordination and increase proprioception  to improve the patients ability to perform daily activities with decreased pain and symptom levels       32 min Neuromuscular Re-education:  [x]  See flow sheet :   Rationale: increase ROM, increase strength, improve coordination, improve balance and increase proprioception  to improve the patients ability to perform daily activities with decreased pain and symptom levels      12 min Manual Therapy:  SALUD AIC correction, Sternal mobilization with active pelvic tilt, Superior T4 (SALUD technique),SALUD infraclavicular rib pump with active breath   Obtained consent for hand placement      Rationale: decrease pain, increase ROM, increase tissue extensibility, decrease trigger points and increase postural awareness to improve the patients ability to perform daily activities with decreased pain and symptom levels    The manual therapy interventions were performed at a separate and distinct time from the therapeutic activities interventions. With   [] TE   [] TA   [] neuro   [] other: Patient Education: [x] Review HEP    [] Progressed/Changed HEP based on:   [] positioning   [] body mechanics   [] transfers   [] heat/ice application    [] other:      Other Objective/Functional Measures:   SALUD Special Tests (pre/post)  HGIR:                                                 Right: 70/90              Left: 85/90  Hip Add Drop Test:                           Right: +/-, 2 in from table        Left:+/midline   Trunk Rot                                           Right: 16 in/14 in   Left 18 in /14 in  Shoulder Horizontal Abduction          Right: 45               Left: 31      Very TTP at left posterior hip, right in addition, but left greater  TTP along right adductor     Pain Level (0-10 scale) post treatment: 1-2    ASSESSMENT/Changes in Function:   Pt tolerated first session after eval well. Had increased questions regarding SALUD principles and approach. Is highly anxious to feel better. Responded well to left oblique facilitation, sagittal plane inhibition and repositioning and left posterior hip capsule inhibition especially right anterior hip shift. Patient will continue to benefit from skilled PT services to modify and progress therapeutic interventions, address functional mobility deficits, address ROM deficits, address strength deficits, analyze and address soft tissue restrictions, analyze and cue movement patterns, analyze and modify body mechanics/ergonomics, assess and modify postural abnormalities and instruct in home and community integration to attain remaining goals. [x]  See Plan of Care  []  See progress note/recertification  []  See Discharge Summary         Progress towards goals / Updated goals:  Short Term Goals: STG- To be accomplished in 5 treatment(s):  1.  Pt will be independent with HEP to encourage prophylaxis.   Eval:dispensed  Current: Progressing 7/29/21 updated this session      Long Term Goals: LTG- To be accomplished in 14 treatment(s):  1.  Pt will improve hip adduction drop to negative to indicate improved femoral-acetabular mobility needed for gait. .  Eval:positive bilaterally      2.  Pt will improve hip adduction lift test to 4/5 bilaterally to indicate proper pelvic position needed for walking .  Eval:0/5 bilaterally     3.  Pt will improve SALUD squat to 3/5 or better to indicate LE, pelvic, hip and back mobility needed for daily activities  Eval:SALUD squat 1/5     4.  Pt FOTO score will increase by 10 points to show improvement in function. 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        PLAN  [x]  Upgrade activities as tolerated     []  Continue plan of care  []  Update interventions per flow sheet       []  Discharge due to:_  []  Other:_      Erica Kan PT, DPT 7/29/2021  3:04 PM    Future Appointments   Date Time Provider [place]   8/3/2021 12:45 PM Rishi Coronado [facility]   8/5/2021  3:45 PM Washburn Boards, PTA [facility]   8/10/2021 12:45 PM Washburn Boards, PTA [facility]   8/12/2021  7:40 AM Bartholome Flax, PT [facility] JABIER SCHED   8/13/2021 11:30 AM Lilly Schmidt PT, DPT [facility]   8/17/2021  1:30 PM Washburn Boards, PTA [facility]   8/19/2021  2:15 PM Lilly Schmidt PT, DPT [facility]   8/24/2021  5:15 PM Lilly Schmidt PT, DPT [facility]   8/31/2021  5:15 PM Lilly Schmidt PT, DPT [facility]   9/1/2021  7:40 AM Bartholome Flax, PT [facility]   9/7/2021  5:15 PM Lilly Schmidt PT, DPT [facility]   9/14/2021  5:15 PM Lilly Schmidt PT, DPRODRICK [facility]   9/21/2021  5:15 PM Lilly Schmidt PT, DPT [facility]   9/28/2021  5:15 PM Lilly Schmidt PT, DPT [facility]

## 2021-08-03 ENCOUNTER — HOSPITAL ENCOUNTER (OUTPATIENT)
Dept: PHYSICAL THERAPY | Age: 31
Discharge: HOME | End: 2021-08-03
Payer: COMMERCIAL

## 2021-08-03 PROCEDURE — 97112 NEUROMUSCULAR REEDUCATION: CPT

## 2021-08-03 PROCEDURE — 97530 THERAPEUTIC ACTIVITIES: CPT

## 2021-08-03 PROCEDURE — 97140 MANUAL THERAPY 1/> REGIONS: CPT

## 2021-08-03 NOTE — PROGRESS NOTES
PT DAILY TREATMENT NOTE    Patient Name: Jonatan Joiner  Date:8/3/2021  : 1990  [x]  Patient  Verified  Payor: Corby Dwyer / Plan: Dre Mcnair [id number] PPO / Product Type: PPO /    In time:1:00  Out time:2:10  Total Treatment Time (min): 70  Total Timed Codes (min): 70  1:1 Treatment Time ( only): 70   Visit #: 3 of 14    Treatment Dx: Low back pain [M54.5]  Sacroiliac joint pain [M53.3]    SUBJECTIVE  Pain Level (0-10 scale): 6  Any medication changes, allergies to medications, adverse drug reactions, diagnosis change, or new procedure performed?: [x] No    [] Yes (see summary sheet for update)  Subjective functional status/changes:   [] No changes reported  \"I was having a lot of left hip pain and clicking after I left here last time. And more tightness in the pelvic area. I feel like I should be doing more strengthening. \"     OBJECTIVE          25 min Therapeutic Activity:  []  See flow sheet :   Rationale: increase ROM, increase strength, improve coordination and improve balance  to improve the patients ability to perform pain free ADL's      30 min Neuromuscular Re-education:  []  See flow sheet :   Rationale: increase ROM, increase strength, improve coordination and improve balance  to improve the patients ability to perform pain free ADl's     15 min Manual Therapy:  Manual left posterior hip stretch   STM and MFR and cupping to lumbar paraspinals in left adductor pull back and bobby pose. Manual trigger point release to left posterior hip in right side lying      Rationale: decrease pain, increase ROM, increase tissue extensibility and decrease edema  to improve the patients ability to perform pain free ADL's   The manual therapy interventions were performed at a separate and distinct time from the therapeutic activities interventions.           With   [] TE   [] TA   [] neuro   [] other: Patient Education: [x] Review HEP    [] Progressed/Changed HEP based on:   [] positioning   [] body mechanics   [] transfers   [] heat/ice application    [] other:      Other Objective/Functional Measures:   SALUD Special Tests (pre/post)  Hip IR          Right: 35             Left: 35  Hip ER  Right 30/35  Left 28/35  SLR   Right: 65             Left: 60  Hip Add Drop Test:    Right: +/mid            Left:+/mid                Pain Level (0-10 scale) post treatment: 3*    ASSESSMENT/Changes in Function:   Pt reported Tension lower abdomen and sharp pain in left hip with standing and walking. Pt reported pain increased after last session. Noted pt continues to present as PEC left AIC with elevated left ASIS and medial malleolus suggesting left upslip per visual assessment. Also very  tight left posterior hip capsule. Pt expressed concern with progress in PT and if she should be doing more strengthening. Discussed with Pt that plan for treatment is going to included repositioning first then strengthening. Decreased level of difficulty of all exercises  to reclined Hamstrings and seated Add pull back with emphasis on feeling ischial tuberosity. Plan to decreased glut \"clenching\" . Pt tolerated session well and will reassess Next session. Patient will continue to benefit from skilled PT services to modify and progress therapeutic interventions, address functional mobility deficits, address ROM deficits, address strength deficits, analyze and address soft tissue restrictions, analyze and cue movement patterns, analyze and modify body mechanics/ergonomics, assess and modify postural abnormalities, address imbalance/dizziness and instruct in home and community integration to attain remaining goals. [x]  See Plan of Care  []  See progress note/recertification  []  See Discharge Summary         Progress towards goals / Updated goals:    Short Term Goals: STG- To be accomplished in 5 treatment(s):  1.  Pt will be independent with HEP to encourage prophylaxis.   Eval:dispensed  Current:updated this session : progressing 8/3/21     Long Term Goals: LTG- To be accomplished in 14 treatment(s):  1.  Pt will improve hip adduction drop to negative to indicate improved femoral-acetabular mobility needed for gait. .  Eval:positive bilaterally      2.  Pt will improve hip adduction lift test to 4/5 bilaterally to indicate proper pelvic position needed for walking .  Eval:0/5 bilaterally     3.  Pt will improve SALUD squat to 3/5 or better to indicate LE, pelvic, hip and back mobility needed for daily activities  Eval:SALUD squat 1/5     4.  Pt FOTO score will increase by 10 points to show improvement in function. 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         PLAN  []  Upgrade activities as tolerated     [x]  Continue plan of care  []  Update interventions per flow sheet       []  Discharge due to:_  []  Other:_      Kendrick Lake PTA 8/3/2021  12:58 PM    Future Appointments   Date Time Provider [place]   8/5/2021  3:45 PM Radha Khoury [facility]   8/10/2021 12:45 PM Beti Larose PTA [facility]   8/12/2021  7:40 AM Gretta Ortiz PT [facility] JABIER MCKENNA   8/13/2021 11:30 AM Mariposa Bernal, PT, DPT [facility]   8/17/2021  1:30 PM Beti Larose PTA [facility]   8/19/2021  2:15 PM Mariposa Sos, PT, DPT [facility]   8/24/2021  5:15 PM Maripoas Sos, PT, DPT [facility]   8/31/2021  5:15 PM Mariposa Sos, PT, DPT [facility]   9/1/2021  7:40 AM DIANE Wray   9/7/2021  5:15 PM Mariposa Bernal, PT, DPT [facility]   9/14/2021  5:15 PM Mariposa Sos PT, DPT [facility]   9/21/2021  5:15 PM Mariposa Sos, PT, DPT [facility]   9/28/2021  5:15 PM Mariposa Sos, PT, DPT [facility]

## 2021-08-05 ENCOUNTER — HOSPITAL ENCOUNTER (OUTPATIENT)
Dept: PHYSICAL THERAPY | Age: 31
Discharge: HOME | End: 2021-08-05
Payer: COMMERCIAL

## 2021-08-05 PROCEDURE — 97140 MANUAL THERAPY 1/> REGIONS: CPT

## 2021-08-05 PROCEDURE — 97112 NEUROMUSCULAR REEDUCATION: CPT

## 2021-08-05 PROCEDURE — 97530 THERAPEUTIC ACTIVITIES: CPT

## 2021-08-05 NOTE — PROGRESS NOTES
PT DAILY TREATMENT NOTE    Patient Name: Inessa Soto  Date:2021  : 1990  [x]  Patient  Verified  Payor: лЕена Rizvi / Plan: Dre Mcnair [id number] PPO / Product Type: PPO /    In time:3:54  Out time:5:00  Total Treatment Time (min): 66  Total Timed Codes (min): 66  1:1 Treatment Time ( only): 66   Visit #: 4 of 14    Treatment Dx: Low back pain [M54.5]  Sacroiliac joint pain [M53.3]    SUBJECTIVE  Pain Level (0-10 scale): 4  Any medication changes, allergies to medications, adverse drug reactions, diagnosis change, or new procedure performed?: [x] No    [] Yes (see summary sheet for update)  Subjective functional status/changes:   [] No changes reported  \" Less left hip pain. Worse tension  Across the front of the pelvis today. 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 \"     OBJECTIVE        20 min Therapeutic Activity:  []  See flow sheet :   Rationale: increase ROM, increase strength, improve coordination and improve balance  to improve the patients ability to perform pain free ADL's      35 min Neuromuscular Re-education:  []  See flow sheet :   Rationale: increase ROM, increase strength, improve coordination, improve balance and increase proprioception  to improve the patients ability to perform pain free ADL\"s     11 min Manual Therapy:   Left posterior hip capsule release stretch   STM to left posteriof hip   Inhibition of left paraspinals with left adductor pull back   Rib pumping on left with consent for hand placement . Rationale: decrease pain, increase ROM, increase tissue extensibility and decrease edema  to improve the patients ability to perform pain  Free ADL's   The manual therapy interventions were performed at a separate and distinct time from the therapeutic activities interventions.           With   [] TE   [] TA   [] neuro   [] other: Patient Education: [x] Review HEP    [] Progressed/Changed HEP based on:   [] positioning   [] body mechanics   [] transfers   [] heat/ice application    [] other:      Other Objective/Functional Measures:   SALUD Special Tests (pre/post)  HGIR:                          Right: 85/90             Left: 90/90    Hip Add Drop Test:      Right: +/mid             Left:+/mid    PADT           Right: + /             Left: - /   PART   Right -  Left +  Extension Drop                                   Right : -          Left: -  Standing reach   +  3 inches from floor   Squat 3/5     * Possible patho PEC due to PART and Ext drop        Pain Level (0-10 scale) post treatment: 3    ASSESSMENT/Changes in Function:   Pt presented in therapy with c/otension pain across lower abdomen however stated left hip pain was resolved. Noted + ADD drop and PADT,however negative Extensor drop. Suspect continued PEC pt . Initiated Pelvic restoration treatment starting with repositioning with standing IO/TA. Pt was very challenged with PPT into wall. Pt was given this and seated squat reach for HEP to provided Posterior inlet inhibition and facilitated PPT. Progressed session to work left IP IR inlet and IsP ER outlet per pelvic protocol ( page 85-87)   Plan to assess pt next visit and progress ,maybe train erum in this position. Patient will continue to benefit from skilled PT services to modify and progress therapeutic interventions, address functional mobility deficits, address ROM deficits, address strength deficits, analyze and address soft tissue restrictions, analyze and cue movement patterns, analyze and modify body mechanics/ergonomics, assess and modify postural abnormalities, address imbalance/dizziness and instruct in home and community integration to attain remaining goals.      [x]  See Plan of Care  []  See progress note/recertification  []  See Discharge Summary         Progress towards goals / Updated goals:  Short Term Goals: STG- To be accomplished in 5 treatment(s):  1.  Pt will be independent with HEP to encourage prophylaxis. Eval:dispensed  Current:updated this session : progressing 8/5/21     Long Term Goals: LTG- To be accomplished in 14 treatment(s):  1.  Pt will improve hip adduction drop to negative to indicate improved femoral-acetabular mobility needed for gait. .  Eval:positive bilaterally   Current:Improved ADD drop after session however continued muscle gaurding :progrssing 8/5/21     2.  Pt will improve hip adduction lift test to 4/5 bilaterally to indicate proper pelvic position needed for walking .  Eval:0/5 bilaterally     3.  Pt will improve SALUD squat to 3/5 or better to indicate LE, pelvic, hip and back mobility needed for daily activities  Eval:SALUD squat 1/5  Current :Functionalsquat 3/5 :progressing 8/5/21     4.  Pt FOTO score will increase by 10 points to show improvement in function. 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        PLAN  []  Upgrade activities as tolerated     [x]  Continue plan of care  []  Update interventions per flow sheet       []  Discharge due to:_  []  Other:_      Taz Palmer PTA 8/5/2021  3:56 PM    Future Appointments   Date Time Provider [place]   8/10/2021 12:45 PM Rufino Hines [facility]   8/12/2021  7:40 AM Cristobal Melendrez PT [facility] BALA   8/13/2021 11:30 AM Augustine Arguelles PT, DPT [facility]   8/17/2021  1:30 PM Isaura Kemp PTA [facility]   8/19/2021  2:15 PM Augustine Arguelles PT, DPT [facility]   8/24/2021  5:15 PM Augustine Arguelles PT, DPT [facility]   8/31/2021  5:15 PM Augustine Arguelles PT, DPT [facility]   9/1/2021  7:40 AM Cristobal Melendrez PT [address] [place] [address]   9/7/2021  5:15 PM Augustine Arguelles PT, DPT [facility]   9/14/2021  5:15 PM Augustine Arguelles PT, DPT [facility]   9/21/2021  5:15 PM Augustine Arguelles PT, SHARRI [facility]   9/28/2021  5:15 PM Augustine Arguelles PT, SHARRI [facility]

## 2021-08-10 ENCOUNTER — HOSPITAL ENCOUNTER (OUTPATIENT)
Dept: PHYSICAL THERAPY | Age: 31
Discharge: HOME | End: 2021-08-10
Payer: COMMERCIAL

## 2021-08-10 PROCEDURE — 97110 THERAPEUTIC EXERCISES: CPT

## 2021-08-10 PROCEDURE — 97530 THERAPEUTIC ACTIVITIES: CPT

## 2021-08-10 PROCEDURE — 97140 MANUAL THERAPY 1/> REGIONS: CPT

## 2021-08-10 PROCEDURE — 97112 NEUROMUSCULAR REEDUCATION: CPT

## 2021-08-10 NOTE — PROGRESS NOTES
PT DAILY TREATMENT NOTE    Patient Name: Daina Barrow  Date:8/10/2021  : 1990  [x]  Patient  Verified  Payor: BLUE CROSS / Plan: Dre Mcnair [id number] PPO / Product Type: PPO /    In time:12:45  Out time:1:45  Total Treatment Time (min): 60  Total Timed Codes (min): 60  1:1 Treatment Time (MC only): 60   Visit #: 5 of 14    Treatment Dx: Low back pain [M54.5]  Sacroiliac joint pain [M53.3]    SUBJECTIVE  Pain Level (0-10 scale): 3  Any medication changes, allergies to medications, adverse drug reactions, diagnosis change, or new procedure performed?: [x] No    [] Yes (see summary sheet for update)  Subjective functional status/changes:   [] No changes reported  \" Some is better , Some is worse. I noticed my back was flush against the seat. \"    OBJECTIVE        15 min Therapeutic Exercise:  [] See flow sheet :   Rationale: increase ROM, increase strength, improve coordination and improve balance to improve the patients ability to perform pain free ADL\"s     15 min Therapeutic Activity:  []  See flow sheet :   Rationale: increase ROM, increase strength, improve coordination and improve balance  to improve the patients ability to perform pain free ADL\"s      22 min Neuromuscular Re-education:  []  See flow sheet :   Rationale: increase ROM, increase strength, improve coordination and improve balance  to improve the patients ability to perform pain free ADL\"s     8 min Manual Therapy:  Manual bilateral rib pumping  Manual left posterior hip capsule stretching and STM   Manual LEft IsP IR outlet inhibition and Left IP ER Inlet inhibition   *see chart    Rationale: decrease pain, increase ROM, increase tissue extensibility and decrease edema  to improve the patients ability to perform pain free ADL's   The manual therapy interventions were performed at a separate and distinct time from the therapeutic activities interventions.             With   [x] TE   [] TA   [] neuro   [] other: Patient Education: [x] Review HEP    [] Progressed/Changed HEP based on:   [] positioning   [] body mechanics   [] transfers   [] heat/ice application    [] other:      Other Objective/Functional Measures:    SALUD Special Tests (pre/post)    hipIR   Right: 35/             Left: 30  Hip ER   Right 25  Left 30  Hip Add Drop Test:      Right: +/-            Left:+/-    PART        Right: - /-             Left: + /-     PADT                                 Right: +/+            Left: +/+  Extension Drop                                   Right : -          Left: -        Pain Level (0-10 scale) post treatment: 2    ASSESSMENT/Changes in Function:   Pt reported feeling her low back against her seat in the car. Treated as path PEC because of left + PART. Pt responded very well to standing supported IO/TA. Progressed pt to 90/90 with passive PPT with FAIR. Pt appeared to tolerate well with verbal confirmation of feeling hamstrings. Plan to reassess if there was an adverse reaction at next visit. Patient will continue to benefit from skilled PT services to modify and progress therapeutic interventions, address functional mobility deficits, address ROM deficits, address strength deficits, analyze and address soft tissue restrictions, analyze and cue movement patterns, analyze and modify body mechanics/ergonomics, assess and modify postural abnormalities, address imbalance/dizziness and instruct in home and community integration to attain remaining goals. [x]  See Plan of Care  []  See progress note/recertification  []  See Discharge Summary         Progress towards goals / Updated goals:    Short Term Goals: STG- To be accomplished in 5 treatment(s):  1.  Pt will be independent with HEP to encourage prophylaxis. Eval:dispensed  Current:updatedlast  Session.  Pt reported compliance  : progressing 8/10/21     Long Term Goals: LTG- To be accomplished in 14 treatment(s):  1.  Pt will improve hip adduction drop to negative to indicate improved femoral-acetabular mobility needed for gait. .  Eval:positive bilaterally   Current:Improved ADD drop after repositioning . however continued muscle gaurding :progrssing 8/10/21     2.  Pt will improve hip adduction lift test to 4/5 bilaterally to indicate proper pelvic position needed for walking .  Eval:0/5 bilaterally     3.  Pt will improve SALUD squat to 3/5 or better to indicate LE, pelvic, hip and back mobility needed for daily activities  Eval:SALUD squat 1/5  Current :Functionalsquat 3/5 :progressing 8/5/21     4.  Pt FOTO score will increase by 10 points to show improvement in function. 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      PLAN  []  Upgrade activities as tolerated     [x]  Continue plan of care  []  Update interventions per flow sheet       []  Discharge due to:_  []  Other:_      Chandni Kendrick PTA 8/10/2021  12:48 PM    Future Appointments   Date Time Provider Kevin Newberry   8/12/2021  7:40 AM Charisse Sorensen PT [facility]   8/13/2021 11:30 AM Sheryl Aguilar PT, DPT [facility]   8/17/2021  1:30 PM Christopher Laws PTA [facility]   8/19/2021  2:15 PM Sheryl Aguilar PT, DPT [facility]   8/24/2021  5:15 PM Sheryl Aguilar PT, DPT [facility]   8/31/2021  7:45 AM Christopher Laws PTA [facility]   9/1/2021  7:40 AM Charisse Sorensen PT [facility] SCHED   9/9/2021  5:15 PM Christopher Laws PTA [facility]   9/16/2021  5:15 PM Christopher Laws PTA [facility]   9/23/2021  5:15 PM Christopher Laws PTA [facility] THE [facility]   9/28/2021  5:15 PM Sheryl Aguilar PT, DPT [facility] THE [facility]

## 2021-08-13 ENCOUNTER — HOSPITAL ENCOUNTER (OUTPATIENT)
Dept: PHYSICAL THERAPY | Age: 31
Discharge: HOME | End: 2021-08-13
Payer: COMMERCIAL

## 2021-08-13 PROCEDURE — 97530 THERAPEUTIC ACTIVITIES: CPT

## 2021-08-13 PROCEDURE — 97140 MANUAL THERAPY 1/> REGIONS: CPT

## 2021-08-13 PROCEDURE — 97112 NEUROMUSCULAR REEDUCATION: CPT

## 2021-08-13 NOTE — PROGRESS NOTES
PT DAILY TREATMENT NOTE    Patient Name: Saud Fitzgerald  Date:2021  : 1990  [x]  Patient  Verified  Payor: BLUE CROSS / Plan: Dre Mcnair [id number] PPO / Product Type: PPO /    In time:11:35 am  Out time:12:35 pm  Total Treatment Time (min): 60  Total Timed Codes (min): 60  1:1 Treatment Time (MC only): 60   Visit #: 6 of 12    Treatment Dx: Low back pain [M54.5]  Sacroiliac joint pain [M53.3]    SUBJECTIVE  Pain Level (0-10 scale): 3  Any medication changes, allergies to medications, adverse drug reactions, diagnosis change, or new procedure performed?: [x] No    [] Yes (see summary sheet for update)  Subjective functional status/changes:   [] No changes reported  Pt reported that did not have PF PT yesterday due to scheduling error. Next follow up is in October  Reports being sore after last session but decreased with moving around.      OBJECTIVE    15 min Therapeutic Activity:  [x]  See flow sheet :   Rationale: increase ROM, increase strength, improve coordination and increase proprioception  to improve the patients ability to perform daily activities with decreased pain and symptom levels       30 min Neuromuscular Re-education:  [x]  See flow sheet :   Rationale: increase ROM, increase strength, improve coordination and increase proprioception  to improve the patients ability to perform daily activities with decreased pain and symptom levels      15 min Manual Therapy:   Sternal mobilization with active pelvic tilt, Superior T4 (SALUD technique),  SALUD infraclavicular rib pump with active breath, rib mobs  Manual Left IsP IR outlet inhibition and Left IP ER Inlet inhibition   *see chart   Obtained consent for hand placement      Rationale: decrease pain, increase ROM, increase tissue extensibility, decrease trigger points and increase postural awareness to improve the patients ability to perform daily activities with decreased pain and symptom levels    The manual therapy interventions were performed at a separate and distinct time from the therapeutic activities interventions. With   [] TE   [] TA   [] neuro   [] other: Patient Education: [x] Review HEP    [] Progressed/Changed HEP based on:   [] positioning   [] body mechanics   [] transfers   [] heat/ice application    [] other:      Other Objective/Functional Measures:   SALUD Special Tests (pre/post)  HGIR:                                                 Right: 85/90             Left: 90  Hip Add Drop Test:                           Right: + w/ drop/-     Left:+/-  Trunk Rot                                           Right: 15.5 in/14 in   Left 15 in /14 in  Noted restrictions with trunk rot  Shoulder Horizontal Abduction          Right: [facility] /NT          Left: 35 /NT  Passive Abduction Raise  Right:-   Left: +  Hip IR     Right: 23  Left: 24  Hip ER     Right: 20  Left: 22    Pain Level (0-10 scale) post treatment: 0-1    ASSESSMENT/Changes in Function:   Pt demonstrated good carryover form last session. Able to recruit and feel hamstring engagement. Noted overactive right adductor in 90-90 and encouraged with right anterior hip shift. Overall progressing and reported feeling looser at end of session. Patient will continue to benefit from skilled PT services to modify and progress therapeutic interventions, address functional mobility deficits, address ROM deficits, address strength deficits, analyze and address soft tissue restrictions, analyze and cue movement patterns, analyze and modify body mechanics/ergonomics, assess and modify postural abnormalities and instruct in home and community integration to attain remaining goals. [x]  See Plan of Care  []  See progress note/recertification  []  See Discharge Summary         Progress towards goals / Updated goals:  Short Term Goals: STG- To be accomplished in 5 treatment(s):  1.  Pt will be independent with HEP to encourage prophylaxis.   Eval:dispensed  Current:updatedlast Session. Pt reported compliance  : progressing 8/13/21     Long Term Goals: LTG- To be accomplished in 14 treatment(s):  1.  Pt will improve hip adduction drop to negative to indicate improved femoral-acetabular mobility needed for gait. .  Eval:positive bilaterally   Current:Progressing 8/13/21 Hip Add Drop Test:                           Right: + w/ drop/-     Left:+/-       2.  Pt will improve hip adduction lift test to 4/5 bilaterally to indicate proper pelvic position needed for walking .  Eval:0/5 bilaterally     3.  Pt will improve SALUD squat to 3/5 or better to indicate LE, pelvic, hip and back mobility needed for daily activities  Eval:SALUD squat 1/5  Current :Functionalsquat 3/5 :progressing 8/5/21     4.  Pt FOTO score will increase by 10 points to show improvement in function. 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   8/19/2021  2:15 PM Zuhair Fairchild PT, DPT [facility]PT[facility] THE [facility]   8/24/2021  5:15 PM Zuhair Fairchild PT, DPT [facility]PT[facility] THE [facility]   8/31/2021  7:45 AM Yulisa Neri PTA [facility]   9/1/2021  7:40 AM Zeenat Lagunas PT [facility]   9/9/2021  5:15 PM Yulisa Hyalice, PTA [facility]   9/16/2021  5:15 PM Maximpako Patricklan, PTA [facility]PTBW [facility]   9/23/2021  5:15 PM Maximiano Hylan, PTA MIHPTBW THE [facility]   9/28/2021  5:15 PM Zuhair Fairchild, PT, DPT [facility]AM THE [facility]   10/20/2021  7:40 AM Brock Swann, Jaxon Butler, DIANE Daniel

## 2021-08-17 ENCOUNTER — HOSPITAL ENCOUNTER (OUTPATIENT)
Dept: PHYSICAL THERAPY | Age: 31
Discharge: HOME | End: 2021-08-17
Payer: COMMERCIAL

## 2021-08-17 PROCEDURE — 97112 NEUROMUSCULAR REEDUCATION: CPT

## 2021-08-17 PROCEDURE — 97140 MANUAL THERAPY 1/> REGIONS: CPT

## 2021-08-17 PROCEDURE — 97110 THERAPEUTIC EXERCISES: CPT

## 2021-08-17 PROCEDURE — 97530 THERAPEUTIC ACTIVITIES: CPT

## 2021-08-17 NOTE — PROGRESS NOTES
PT DAILY TREATMENT NOTE    Patient Name: Yoandy Cantu  Date:2021  : 1990  [x]  Patient  Verified  Payor: BLUE CROSS / Plan: Dre Mcnair [id number] PPO / Product Type: PPO /    In time:1:30  Out time:2:30  Total Treatment Time (min): 60  Total Timed Codes (min): 60  1:1 Treatment Time (MC only): 60   Visit #: 7 of 12    Treatment Dx: Low back pain [M54.5]  Sacroiliac joint pain [M53.3]    SUBJECTIVE  Pain Level (0-10 scale): 3  Any medication changes, allergies to medications, adverse drug reactions, diagnosis change, or new procedure performed?: [x] No    [] Yes (see summary sheet for update)  Subjective functional status/changes:   [] No changes reported  \"we did a lot of driving this weekend and walking, but I feel ok. I have some Low back pain. \"    OBJECTIVE      20 min Therapeutic Exercise:  [] See flow sheet :   Rationale: increase ROM, increase strength and improve coordination to improve the patients ability to perform pain free ADL's     10 min Therapeutic Activity:  []  See flow sheet :   Rationale: increase ROM, increase strength, improve coordination, improve balance and increase proprioception  to improve the patients ability to perform pain free ADL's      20 min Neuromuscular Re-education:  []  See flow sheet :   Rationale: increase ROM, increase strength, improve coordination and improve balance  to improve the patients ability to perform pain free ADL's     10 min Manual Therapy:   SALUD infraclavicular rib pump with active breath, rib mobs  Manual Left IsP IR outlet inhibition and Left IP ER Inlet inhibition   *see chart   Obtained consent for hand placement       Rationale: decrease pain, increase ROM, increase tissue extensibility, decrease edema  and increase postural awareness to improve the patients ability to perform pain free ADL's   The manual therapy interventions were performed at a separate and distinct time from the therapeutic activities interventions. With   [] TE   [] TA   [] neuro   [] other: Patient Education: [x] Review HEP    [] Progressed/Changed HEP based on:   [] positioning   [] body mechanics   [] transfers   [] heat/ice application    [] other:      Other Objective/Functional Measures:   SALUD Special Tests (pre/post)  HGIR:                                                 Right: 90             Left: 90  Shoulder Flexion   Right: ful/             Left: full/  Hip Add Drop Test:                           Right: -            Left:-    PART         Right: -              Left: +/+   PADT  Right +  Left +     SLR                                 Right: 70            Left: 80           Pain Level (0-10 scale) post treatment: 0    ASSESSMENT/Changes in Function:   Pt reported long car ride, and was able to tolerate . Performed prolonged walking and reported with increased low back. Reported decreased obturator pain. Pt presented with improving measures of aDD drop. Continues to have + left PART. Therefore pt is unable to ADDuct left pelvic outlet and ABduct pelvic inlet on the left. Pt tolerated progressing to FAIR 90/90 and standing resisted IO/TA well. Reported challenged with resisted IO/TA  Plan to [facility] to [facility] protocol then Left aIC if pt continues to present with improved measures. Patient will continue to benefit from skilled PT services to modify and progress therapeutic interventions, address functional mobility deficits, address ROM deficits, address strength deficits, analyze and address soft tissue restrictions, analyze and cue movement patterns, analyze and modify body mechanics/ergonomics, assess and modify postural abnormalities, address imbalance/dizziness and instruct in home and community integration to attain remaining goals. [x]  See Plan of Care  []  See progress note/recertification  []  See Discharge Summary         Progress towards goals / Updated goals:    Short Term Goals: STG- To be accomplished in 5 treatment(s):  1.  Pt will be independent with HEP to encourage prophylaxis. Eval:dispensed  Current:updatedlast  Session. Pt reported compliance  : progressing 8/13/21     Long Term Goals: LTG- To be accomplished in 14 treatment(s):  1.  Pt will improve hip adduction drop to negative to indicate improved femoral-acetabular mobility needed for gait. .  Eval:positive bilaterally   Current:Progressing 8/13/21 Hip Add Drop Test:      Right: -/- Left:-/- :progressinig 8/17/21        2.  Pt will improve hip adduction lift test to 4/5 bilaterally to indicate proper pelvic position needed for walking .  Eval:0/5 bilaterally  Current:retest NV 8/17/21     3.  Pt will improve SALUD squat to 3/5 or better to indicate LE, pelvic, hip and back mobility needed for daily activities  Eval:SALUD squat 1/5  Current :Functionalsquat 3/5 :progressing 8/5/21     4.  Pt FOTO score will increase by 10 points to show improvement in function. 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 45 min  Current:Pt was able to sit incar for long car ride with only LBP :progressing 8/17/21       PLAN  []  Upgrade activities as tolerated     [x]  Continue plan of care  []  Update interventions per flow sheet       []  Discharge due to:_  []  Other:_      Ashley Jaquez PTA 8/17/2021  1:32 PM    Future Appointments   Date Time Provider Kevin Newberry   8/18/2021  3:40 PM DIANE Moya   8/19/2021  2:15 PM Enrique Hendrickson PT, DPT [facility] THE [facility]   8/24/2021  5:15 PM Enrique Hendrickson PT, DPT [facility]PMIC[facility]   8/31/2021  7:45 AM PRESLEY Gan THE [facility]   9/1/2021  7:40 AM [place]ine Crocker [place] [facility]   9/9/2021  5:15 PM PRESLEY Gan THE [facility]   9/16/2021  5:15 PM PRESLEY Gan [facility]   9/23/2021  5:15 PM [facility]   9/28/2021  5:15 PM Messi Azevedo, PT, DPT MI[facility]PTBW THE [facility]   10/20/2021  7:40 AM Leydi Sánchez, PT Mar[facility]

## 2021-08-19 ENCOUNTER — HOSPITAL ENCOUNTER (OUTPATIENT)
Dept: PHYSICAL THERAPY | Age: 31
Discharge: HOME | End: 2021-08-19
Payer: COMMERCIAL

## 2021-08-19 PROCEDURE — 97530 THERAPEUTIC ACTIVITIES: CPT

## 2021-08-19 PROCEDURE — 97112 NEUROMUSCULAR REEDUCATION: CPT

## 2021-08-19 NOTE — PROGRESS NOTES
PT DAILY TREATMENT NOTE    Patient Name: Cathi Gayle  Date:2021  : 1990  [x]  Patient  Verified  Payor: BLUE ALEJA / Plan: Dre Mcnair [id number] PPO / Product Type: PPO /    In time:2:19 pm  Out time:3:15 pm   Total Treatment Time (min): 56  Total Timed Codes (min): 56  1:1 Treatment Time (MC only): 56   Visit #: 8 of 12    Treatment Dx: Low back pain [M54.5]  Sacroiliac joint pain [M53.3]    SUBJECTIVE  Pain Level (0-10 scale): 3  Any medication changes, allergies to medications, adverse drug reactions, diagnosis change, or new procedure performed?: [x] No    [] Yes (see summary sheet for update)  Subjective functional status/changes:   [] No changes reported  Pt reports going to pelvic floor therapy yesterday and had increased dry needling to adductor yesterday. Reported that felt was not in as much pelvic floor pain at appointment allong PFPT to focus on something else.      OBJECTIVE    24 min Therapeutic Activity:  [x]  See flow sheet :   Rationale: increase ROM, increase strength, improve coordination, improve balance and increase proprioception  to improve the patients ability to perform daily activities with decreased pain and symptom levels       32 min Neuromuscular Re-education:  [x]  See flow sheet :   Rationale: increase ROM, increase strength, improve coordination, improve balance and increase proprioception  to improve the patients ability to perform daily activities with decreased pain and symptom levels            With   [] TE   [] TA   [] neuro   [] other: Patient Education: [x] Review HEP    [] Progressed/Changed HEP based on:   [] positioning   [] body mechanics   [] transfers   [] heat/ice application    [] other:      Other Objective/Functional Measures:   SALUD Special Tests (pre/post)  HGIR:                                                 Right: 85/90              Left: 90  Hip Add Drop Test:                           Right: past midline/- Left:midline/-  Trunk Rot                                           Right: 15.5 in/14 in  Left 15.5 in  /14 in   Shoulder Horizontal Abduction          Right: [facility]               Left: 38 /45       Pain Level (0-10 scale) post treatment: 0-1    ASSESSMENT/Changes in Function:   Pt tolerated treatment session very well. Focussed on left IO/TA facilitation. Required max cues and props for adductor pull back and left glut med. Reported that PF PT felt there was less tone and restrictions. Was able to recruit left glut med and keep right adductor inhibitied. Overall pt appears to be progressing nicely. Patient will continue to benefit from skilled PT services to modify and progress therapeutic interventions, address functional mobility deficits, address ROM deficits, address strength deficits, analyze and address soft tissue restrictions, analyze and cue movement patterns, analyze and modify body mechanics/ergonomics, assess and modify postural abnormalities and instruct in home and community integration to attain remaining goals. [x]  See Plan of Care  []  See progress note/recertification  []  See Discharge Summary         Progress towards goals / Updated goals:  Short Term Goals: STG- To be accomplished in 5 treatment(s):  1.  Pt will be independent with HEP to encourage prophylaxis.   Eval:dispensed  Current:MET - 8/19/21 - pt compliant updated this session.      Long Term Goals: LTG- To be accomplished in 14 treatment(s):  1.  Pt will improve hip adduction drop to negative to indicate improved femoral-acetabular mobility needed for gait. .  Eval:positive bilaterally   Current:Progressing 8/19/21 Hip Add Drop Test:                           Right: past midline/-            Left:midline/-        2.  Pt will improve hip adduction lift test to 4/5 bilaterally to indicate proper pelvic position needed for walking .  Eval:0/5 bilaterally  Current:retest NV 8/19/21     3.  Pt will improve SALUD squat to 3/5 or better to indicate LE, pelvic, hip and back mobility needed for daily activities  Eval:SALUD squat 1/5  Current :Functionalsquat 3/5 :progressing 8/5/21     4.  Pt FOTO score will increase by 10 points to show improvement in function. 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 45 min  Current:Pt was able to sit incar for long car ride with only LBP :progressing 8/17/21    PLAN  []  Upgrade activities as tolerated     []  Continue plan of care  []  Update interventions per flow sheet       []  Discharge due to:_  []  Other:_      Bernis Goodell, PT, DPT 8/19/2021  2:22 PM    Future Appointments   Date Time Provider Kevin Newberry   8/24/2021  5:15 PM Filiberto Enriquez PT, DPT MI[facility]PTBW THE [facility]   8/31/2021  7:45 AM Terrence Racebacilio, PTA [facility]   9/1/2021  7:40 AM [facility]or [facility]   9/9/2021  5:15 PM Terrence Racebacilio, PTA [facility]   9/16/2021  5:15 PM eTrrence Racer, PTA [facility]   9/23/2021  5:15 PM Terrence Racebacilio, PTA [facility]   9/28/2021  5:15 PM Filiberto Enriquez PT, DPT MI[facility]PTBW THE [facility]   10/20/2021  7:40 AM Shruti CHONG, PT [id number]

## 2021-08-24 ENCOUNTER — HOSPITAL ENCOUNTER (OUTPATIENT)
Dept: PHYSICAL THERAPY | Age: 31
Discharge: HOME | End: 2021-08-24
Payer: COMMERCIAL

## 2021-08-24 PROCEDURE — 97112 NEUROMUSCULAR REEDUCATION: CPT

## 2021-08-24 PROCEDURE — 97530 THERAPEUTIC ACTIVITIES: CPT

## 2021-08-24 NOTE — PROGRESS NOTES
PT DAILY TREATMENT NOTE    Patient Name: Haylee Birmingham  Date:2021  : 1990  [x]  Patient  Verified  Payor: BLUE CROSS / Plan: Dre Mcnair [id number] PPO / Product Type: PPO /    In time:5:23 pm  Out time:6:11 pm   Total Treatment Time (min): 45 (waiting on PT)  Total Timed Codes (min): 45  1:1 Treatment Time ( [address] only): 45   Visit #: 9 of 12    Treatment Dx: Low back pain [M54.5]  Sacroiliac joint pain [M53.3]    SUBJECTIVE  Pain Level (0-10 scale): 5  Any medication changes, allergies to medications, adverse drug reactions, diagnosis change, or new procedure performed?: [x] No    [] Yes (see summary sheet for update)  Subjective functional status/changes:   [] No changes reported  \"I am in pain. 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    Rationale: increase ROM, increase strength, improve coordination and increase proprioception  to improve the patients ability to perform daily activities with decreased pain and symptom levels       25 min Neuromuscular Re-education:  [x]  See flow sheet :   Rationale: increase ROM, increase strength, improve coordination and increase proprioception  to improve the patients ability to perform daily activities with decreased pain and symptom levels      3 min Manual Therapy:  Left IP ER Inlet inhibition   *see chart   Obtained consent for hand placement       Rationale: decrease pain, increase ROM, increase tissue extensibility, decrease trigger points and increase postural awareness to improve the patients ability to perform daily activities with decreased pain and symptom levels    The manual therapy interventions were performed at a separate and distinct time from the therapeutic activities interventions.           With   [] TE   [] TA   [] neuro   [] other: Patient Education: [x] Review HEP    [] Progressed/Changed HEP based on:   [] positioning   [] body mechanics   [] transfers   [] heat/ice application    [] other:      Other Objective/Functional Measures:   SALUD Special Tests (pre/post)  HGIR:                                                 Right: 78/90             Left: 90/90  Hip Add Drop Test:                           Right: midline/-            Left:Midline/-  Trunk Rot                                           Right: 15 in/13 in  Left 16 in/13 in   Shoulder Horizontal Abduction          Right: WFL              Left: 36 /NT  Passive [place] Raise Test Right: +  Left:+     Pain Level (0-10 scale) post treatment: 2-3    ASSESSMENT/Changes in Function:   Pt repositioned nicely with interventions this session. Reported flare up of right obturator pain over weekend but pain was not constant, more intermittent. Current treatment session decreased left posterior hip pain and pain referred into left LE. Challenged with glut med facilitation. Was able to achieve left obliques with standing wall reach with right UE. Patient will continue to benefit from skilled PT services to modify and progress therapeutic interventions, address functional mobility deficits, address ROM deficits, address strength deficits, analyze and address soft tissue restrictions, analyze and cue movement patterns, analyze and modify body mechanics/ergonomics, assess and modify postural abnormalities and instruct in home and community integration to attain remaining goals. [x]  See Plan of Care  []  See progress note/recertification  []  See Discharge Summary         Progress towards goals / Updated goals:  Short Term Goals: STG- To be accomplished in 5 treatment(s):  1.  Pt will be independent with HEP to encourage prophylaxis.   Eval:dispensed  Current:MET - 8/19/21 - pt compliant updated this session.      Long Term Goals: LTG- To be accomplished in 14 treatment(s):  1.  Pt will improve hip adduction drop to negative to indicate improved femoral-acetabular mobility needed for gait. .  Eval:positive bilaterally   Current:Progressing 8/24/21 Hip Add Drop Test:                                Right: past midline/-               Left:midline/-        2.  Pt will improve hip adduction lift test to 4/5 bilaterally to indicate proper pelvic position needed for walking .  Eval:0/5 bilaterally  Current:retest NV 8/19/21     3.  Pt will improve SALUD squat to 3/5 or better to indicate LE, pelvic, hip and back mobility needed for daily activities  Eval:SALUD squat 1/5  Current :Functionalsquat 3/5 :progressing 8/5/21     4.  Pt FOTO score will increase by 10 points to show improvement in function. 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  Current:Pt was able to sit incar for long car ride with only LBP :progressing 8/17/21    PLAN  [x]  Upgrade activities as tolerated     [x]  Continue plan of care  []  Update interventions per flow sheet       []  Discharge due to:_  []  Other:_      Stacey Forman, PT, DPT 8/24/2021  5:29 PM    Future Appointments   Date Time Provider [place]   8/31/2021  7:45 AM Silvia Sportsman, PTA [facility]   9/1/2021  7:40 AM [place] [facility] [place]   9/9/2021  5:15 PM Silvia Sportsman, PTA [facility]   9/16/2021  5:15 PM Silvia Sportsman, PTA [facility]   9/23/2021  5:15 PM Silvia Sportsman, PTA [facility]   9/28/2021  5:15 PM Phuong Walker, PT, DPT [facility]   10/20/2021  7:40 AM DIANE Sanabria

## 2021-08-31 ENCOUNTER — HOSPITAL ENCOUNTER (OUTPATIENT)
Dept: PHYSICAL THERAPY | Age: 31
Discharge: HOME | End: 2021-08-31
Payer: COMMERCIAL

## 2021-08-31 PROCEDURE — 97112 NEUROMUSCULAR REEDUCATION: CPT

## 2021-08-31 PROCEDURE — 97140 MANUAL THERAPY 1/> REGIONS: CPT

## 2021-08-31 PROCEDURE — 97530 THERAPEUTIC ACTIVITIES: CPT

## 2021-08-31 NOTE — PROGRESS NOTES
PT DAILY TREATMENT NOTE    Patient Name: Jelena Ramsey  Date:2021  : 1990  [x]  Patient  Verified  Payor: Julian  / Plan: Dre Mcnair [id number] PPO / Product Type: PPO /    In time:7:45  Out time:8:35  Total Treatment Time (min): 50  Total Timed Codes (min): 50  1:1 Treatment Time ([facility] only): 50   Visit #: 10 of 12    Treatment Dx: Low back pain [M54.5]  Sacroiliac joint pain [M53.3]    SUBJECTIVE  Pain Level (0-10 scale): 3  Any medication changes, allergies to medications, adverse drug reactions, diagnosis change, or new procedure performed?: [x] No    [] Yes (see summary sheet for update)  Subjective functional status/changes:   [] No changes reported  \"last week I was in a lot of pain , but I was doing a lot of sitting. The exercises didn't do much. I got my foam roller and rolled my adductors and stretched. It got significantly better. OBJECTIVE        20 min Therapeutic Activity:  [x]  See flow sheet :   Rationale: increase ROM, increase strength, improve coordination, improve balance and increase proprioception  to improve the patients ability to perform pain free ADL's      22 min Neuromuscular Re-education:  [x]  See flow sheet :   Rationale: increase ROM, increase strength, improve coordination, improve balance and increase proprioception  to improve the patients ability to perform pain free ADL\"s     8 min Manual Therapy:  STM to bilateral adductors in supine adductor inhibition. Rationale: decrease pain, increase ROM, increase tissue extensibility, decrease edema , decrease trigger points and increase postural awareness to improve the patients ability to perform pain free ADL\"s   The manual therapy interventions were performed at a separate and distinct time from the therapeutic activities interventions.               With   [x] TE   [] TA   [] neuro   [] other: Patient Education: [x] Review HEP    [] Progressed/Changed HEP based on:   [] positioning   [] body mechanics [] transfers   [] heat/ice application    [] other:      Other Objective/Functional Measures:   SALUD Special Tests (pre/post)  HGIR:                             Right: 90             Left: 90  SLR   Right: 65             Left: 75  Hip Add Drop Test:       Right: -            Left:-  Trunk Rot                     Right: 16    Left 15   PART          Right: +              Left: +      Hip ER                                 Right: 35            Left: 35  Hip IR                                  Right : 45          Left: 45           Pain Level (0-10 scale) post treatment: 1    ASSESSMENT/Changes in Function:   Pt has been seen in therapy 10x including initial eval. Pt has reported 40% improvement in sx's since starting therapy. Pt has reported increased ability to tolerate standing to cook and sitting to work however is able to tolerate approximately 40 minutes of standing or sitting before discomfort in lower abdomen and groin increases and she needs to change  Positions. Pt continues to demonstrated poor squat mechanics and rotated heme pelvis with weakened and lengthened obliques, and hamstrings with increased tone in bilateral adductors that appear to be maintaining an anteriorly tipped pelvis. Pt reported tension in adductors contributing to lower abdominal pain and pressure. Has experienced relief with foam rolling to adductors. Noted pt ADD drop was clear at start of session however continues to demonstrated + PART bilaterally and SLR. Patient will continue to benefit from skilled PT services to modify and progress therapeutic interventions, address functional mobility deficits, address ROM deficits, address strength deficits, analyze and address soft tissue restrictions, analyze and cue movement patterns, analyze and modify body mechanics/ergonomics, assess and modify postural abnormalities, address imbalance/dizziness and instruct in home and community integration to attain remaining goals.      [x] See Plan of Care  []  See progress note/recertification  []  See Discharge Summary         Progress towards goals / Updated goals:  Short Term Goals: STG- To be accomplished in 5 treatment(s):  1.  Pt will be independent with HEP to encourage prophylaxis. Eval:dispensed  Current:MET - 8/19/21 - pt compliant updated this session.   Added adductor inhibition : 8/30/21     Long Term Goals: LTG- To be accomplished in 14 treatment(s):  1.  Pt will improve hip adduction drop to negative to indicate improved femoral-acetabular mobility needed for gait. .  Eval:positive bilaterally   Current:MET 8/30/21  Hip Add Drop Test:  right -  Left -   At start of session . continued + PART         2.  Pt will improve hip adduction lift test to 4/5 bilaterally to indicate proper pelvic position needed for walking .  Eval:0/5 bilaterally  Current:retest NV 8/19/21     3.  Pt will improve SALUD squat to 3/5 or better to indicate LE, pelvic, hip and back mobility needed for daily activities  Eval:SALUD squat 1/5  Current :3/5*  Progressing 8/30/21     4.  Pt FOTO score will increase by 10 points to show improvement in function. Eval:60  Current: 53 regression 8/30/21     5. 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   Eval: tolerance at 45 min  Current:Pt reported increased LBP and groin pain to 3-5/10 after working and sitting for work training :progressing 8/30/21       PLAN  []  Upgrade activities as tolerated     [x]  Continue plan of care  []  Update interventions per flow sheet       []  Discharge due to:_  []  Other:_      Michael Nettles PTA 8/31/2021  7:48 AM    Future Appointments   Date Time Provider [place]   9/1/2021  7:40 AM Leopoldo Expose, PT [place]   9/9/2021  5:15 PM Monserrat Washington, PTA [facility]   9/16/2021  5:15 PM Monserrat Washington, PTA [facility]   9/23/2021  5:15 PM Monserrat Washington, PTA [facility]   9/28/2021  5:15 PM Curlie Milder, Dena Hedrick, DPT [facility]   10/20/2021  7:40 AM Cynthia Dai, DIANE Daniel

## 2021-08-31 NOTE — PROGRESS NOTES
[facility] at the [address], [place], [address] 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  Pt will be independent with HEP to encourage prophylaxis. Eval:dispensed  Current:MET - 21 - pt compliant updated this session.   Added adductor inhibition : 21     Long Term Goals: LTG- To be accomplished in 14 treatment(s):  1.  Pt will improve hip adduction drop to negative to indicate improved femoral-acetabular mobility needed for gait. .  Eval:positive bilaterally   Current:MET 21  Hip Add Drop Test:  right -  Left -   At start of session .  continued + PART         2.  Pt will improve hip adduction lift test to 4/5 bilaterally to indicate proper pelvic position needed for walking .  Eval:0/5 bilaterally  Current:retest NV 21     3.  Pt will improve SALUD squat to 3/5 or better to indicate LE, pelvic, hip and back mobility needed for daily activities  Eval:SALUD squat 1/5  Current :3/5*  Progressing 21     4.  Pt FOTO score will increase by 10 points to show improvement in function. 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 45 min  Current:Pt reported increased LBP and groin pain to 3-5/10 after working and sitting for work training :progressing 8/30/21          Summary of Care/ Key Functional Changes:   SALUD Special Tests (pre/post)  HGIR:                             Right: 90                       Left: 90  SLR                             Right: 99                      Left: 75  Hip Add Drop Test:       Right: -                Left:-  Trunk Rot                     Right: 16                    Left 15   PART                       Right: +                         Left: +      Hip ER                        Right: 35            Left: 35  Hip IR                          Right : 39                   Left: 39     Pt has been seen in therapy 10x including initial eval. Pt has reported 40% improvement in sx's since starting therapy. Pt has reported increased ability to tolerate standing to cook and sitting to work, however is able to tolerate approximately 40 minutes of standing or sitting before discomfort in lower abdomen and groin increases and she needs to change  Positions. Pt continues to demonstrated poor squat mechanics and rotated franck pelvis with weakened and lengthened obliques, and hamstrings with increased tone in bilateral adductors that appear to be maintaining an anteriorly tipped pelvis.       Pt reported tension in adductors contributing to lower abdominal pain and pressure. Has experienced relief with foam rolling to adductors. Noted pt ADD drop was clear at start of session however continues to demonstrated + PART bilaterally and SLR.      ASSESSMENT/RECOMMENDATIONS:  [x]Continue therapy per initial plan/protocol at a frequency of 2 x per week for 6 weeks  []Continue therapy with the following recommended changes:_____________________      _____________________________________________________________________  []Discontinue therapy progressing towards or have reached established goals  []Discontinue therapy due to lack of appreciable progress towards goals  []Discontinue therapy due to lack of attendance or compliance  []Await Physician's recommendations/decisions regarding therapy  []Other:________________________________________________________________    Thank you for this referral.   Darnell Cedeno PTA 8/31/2021 9:00 AM

## 2021-09-07 ENCOUNTER — APPOINTMENT (OUTPATIENT)
Dept: PHYSICAL THERAPY | Age: 31
End: 2021-09-07
Payer: COMMERCIAL

## 2021-09-09 ENCOUNTER — HOSPITAL ENCOUNTER (OUTPATIENT)
Dept: PHYSICAL THERAPY | Age: 31
Discharge: HOME | End: 2021-09-09
Payer: COMMERCIAL

## 2021-09-09 PROCEDURE — 97112 NEUROMUSCULAR REEDUCATION: CPT

## 2021-09-09 PROCEDURE — 97140 MANUAL THERAPY 1/> REGIONS: CPT

## 2021-09-09 PROCEDURE — 97530 THERAPEUTIC ACTIVITIES: CPT

## 2021-09-09 NOTE — PROGRESS NOTES
PT DAILY TREATMENT NOTE    Patient Name: Yoandy Cantu  Date:2021  : 1990  [x]  Patient  Verified  Payor: Romana Riling / Plan: Dre Mcnair [id number] PPO / Product Type: PPO /    In time:5:25  Out time:6:35  Total Treatment Time (min): 70  Total Timed Codes (min): 70  1:1 Treatment Time ( only): 70   Visit #: 11 of 22    Treatment Dx: Low back pain [M54.5]  Sacroiliac joint pain [M53.3]    SUBJECTIVE  Pain Level (0-10 scale): 3  Any medication changes, allergies to medications, adverse drug reactions, diagnosis change, or new procedure performed?: [x] No    [] Yes (see summary sheet for update)  Subjective functional status/changes:   [] No changes reported  \"I will say I dont feel as bad as I thought I would with as much stress as Im under. \"     OBJECTIVE            15 min Therapeutic Activity:  []  See flow sheet :   Rationale: increase ROM, increase strength, improve coordination, improve balance and increase proprioception  to improve the patients ability to perform pain free ADl's      40 min Neuromuscular Re-education:  []  See flow sheet :   Rationale: increase ROM, increase strength, improve coordination and improve balance  to improve the patients ability to perform pain free ADL's     15 min Manual Therapy:  Vacuum cupping to posterior chain ( lumbar paraspinals , SI , and thoracic paraspinals) in bobby pose with bolster between hips and feet  Manual stretching to left posterior hip  STM to bilateral Adductors in hooklying after adductor inhibition. Rationale: decrease pain, increase ROM, increase tissue extensibility and decrease edema  to improve the patients ability to perform pain free ADL's   The manual therapy interventions were performed at a separate and distinct time from the therapeutic activities interventions.               With   [x] TE   [] TA   [] neuro   [] other: Patient Education: [x] Review HEP    [] Progressed/Changed HEP based on:   [] positioning   [] body mechanics   [] transfers   [] heat/ice application    [] other:      Other Objective/Functional Measures:   SALUD Special Tests (pre/post)  HGIR:                                     Right: 90             Left: 90  Hip IR    Right: 40             Left: 40  Hip Add Drop Test:            Right: mid/-            Left:mid/-  Trunk Rot                           Right: 12    Left 12   PART          Right: + /-             Left: + /-        Pain Level (0-10 scale) post treatment: 0    ASSESSMENT/Changes in Function:   Pt presented in therapy with reports of extreme stress. However stated that even with increased stress and work this week, she has decreased pain than \"usual\". Noted Improved ADD drop test at start of treatment however not clear. Continues to present with + PART and very tone adductors. Pt responded well to cupping posterior chain inhibition and adductor inhibition along with FAIR and left outlet abduction. Patient will continue to benefit from skilled PT services to modify and progress therapeutic interventions, address functional mobility deficits, address ROM deficits, address strength deficits, analyze and address soft tissue restrictions, analyze and cue movement patterns, analyze and modify body mechanics/ergonomics, assess and modify postural abnormalities, address imbalance/dizziness and instruct in home and community integration to attain remaining goals. [x]  See Plan of Care  []  See progress note/recertification  []  See Discharge Summary         Progress towards goals / Updated goals:    Short Term Goals: STG- To be accomplished in 5 treatment(s):  1.  Pt will be independent with HEP to encourage prophylaxis.   Eval:dispensed  Last PN MET - 8/19/21 - pt compliant updated this session.   Added adductor inhibition : 8/30/21     Long Term Goals: LTG- To be accomplished in 14 treatment(s):  1.  Pt will improve hip adduction drop to negative to indicate improved femoral-acetabular mobility needed for gait. .  Eval:positive bilaterally   Last PN:MET 8/30/21  Hip Add Drop Test:  right -  Left -   At start of session . continued + PART         2.  Pt will improve hip adduction lift test to 4/5 bilaterally to indicate proper pelvic position needed for walking .  Eval:0/5 bilaterally  Last PN :retest NV   Current: + PART at start of treatment _ at end . Retest ADD lift next visit if PART is _: progressing 9/9/21    3.  Pt will improve SALUD squat to 3/5 or better to indicate LE, pelvic, hip and back mobility needed for daily activities  Eval:SALUD squat 1/5  Last PN :3/5*  Progressing 8/30/21     4.  Pt FOTO score will increase by 10 points to show improvement in function. 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  Progressing 9/9/21        PLAN  []  Upgrade activities as tolerated     [x]  Continue plan of care  []  Update interventions per flow sheet       []  Discharge due to:_  []  Other:_      Gini Wing PTA 9/9/2021  5:26 PM    Future Appointments   Date Time Provider [place]   9/16/2021  5:15 PM Emelia Villatoro [facility]   9/23/2021  5:15 PM Loco Cross PTA [facility]   9/28/2021  5:15 PM Daria Tolbert, PT, DPT [facility]   10/20/2021  7:40 AM Monique Mccollum, PT [place]

## 2021-09-14 ENCOUNTER — APPOINTMENT (OUTPATIENT)
Dept: PHYSICAL THERAPY | Age: 31
End: 2021-09-14
Payer: COMMERCIAL

## 2021-09-16 ENCOUNTER — HOSPITAL ENCOUNTER (OUTPATIENT)
Dept: PHYSICAL THERAPY | Age: 31
Discharge: HOME | End: 2021-09-16
Payer: COMMERCIAL

## 2021-09-16 PROCEDURE — 97110 THERAPEUTIC EXERCISES: CPT

## 2021-09-16 PROCEDURE — 97530 THERAPEUTIC ACTIVITIES: CPT

## 2021-09-16 PROCEDURE — 97140 MANUAL THERAPY 1/> REGIONS: CPT

## 2021-09-16 PROCEDURE — 97112 NEUROMUSCULAR REEDUCATION: CPT

## 2021-09-16 NOTE — PROGRESS NOTES
PT DAILY TREATMENT NOTE    Patient Name: Steven Agustin  Date:2021  : 1990  [x]  Patient  Verified  Payor: BLUE CROSS / Plan: Dre Mcnair [id number] PPO / Product Type: PPO /    In time:5:20 Out time:6:25  Total Treatment Time (min): 65  Total Timed Codes (min): 65  1:1 Treatment Time ( only): 65   Visit #: 12 of 22    Treatment Dx: Low back pain [M54.5]  Sacroiliac joint pain [M53.3]    SUBJECTIVE  Pain Level (0-10 scale): 3  Any medication changes, allergies to medications, adverse drug reactions, diagnosis change, or new procedure performed?: [x] No    [] Yes (see summary sheet for update)  Subjective functional status/changes:   [] No changes reported  Pt reported having a stressful week and doing well after last session and through a plane ride and standing all weekend. Pain increased after 3 days. OBJECTIVE      15 min Therapeutic Exercise:  [] See flow sheet :   Rationale: increase ROM, increase strength, improve coordination and improve balance to improve the patients ability to perform pain free ADL's     15 min Therapeutic Activity:  []  See flow sheet :   Rationale: increase ROM, increase strength, improve coordination and improve balance  to improve the patients ability to perform pain free ADL\"s      20 min Neuromuscular Re-education:  []  See flow sheet :   Rationale: increase ROM, increase strength, improve coordination, improve balance and increase proprioception  to improve the patients ability to perform pain free ADL's     15 min Manual Therapy:   Vacuum cupping to posterior chain ( lumbar paraspinals , SI , and thoracic paraspinals) in bobby pose with bolster between hips and feet  Manual stretching to left posterior hip  STM to bilateral Adductors in hooklying after adductor inhibition.     Rationale: decrease pain, increase ROM, increase tissue extensibility and decrease edema  to improve the patients ability to perform pain free ADL's   The manual therapy interventions were performed at a separate and distinct time from the therapeutic activities interventions. With   [x] TE   [] TA   [] neuro   [] other: Patient Education: [x] Review HEP    [] Progressed/Changed HEP based on:   [] positioning   [] body mechanics   [] transfers   [] heat/ice application    [] other:      Other Objective/Functional Measures:   SALUD Special Tests (pre/post)  HGIR:                                                 Right: 90             Left: 90  Shoulder Flexion   Right: full             Left: full  Hip Add Drop Test:                           Right: mid/-            Left:mid/-    Hip IR         Right: 40              Left: 40   Hip ER  Right 30  Left 30     PART                               Right: +/better            Left: +/better  SLR                                 Right :67           Left: 67         Pain Level (0-10 scale) post treatment: 0    ASSESSMENT/Changes in Function:   Pt presented in therapy with c/o 3/10 pain and tension in anterior pelvis. Pt reported having increased stress this week as well as a plane ride and increased standing. Pt reported HEP and foam rolling adductors is helping to reduce sx's. Noted excessive bilateral passive hip IR, and pt c/o \"tight\" adductors. Pt responded well to cupping lumbar paraspinals and sacrum. Trialed addition of franck 90 / 90 left adductor and right glut in extension. Also added inhalation/ exhale with quadruped hip lift. Reassess next session. Plan to progress to standing.      Patient will continue to benefit from skilled PT services to modify and progress therapeutic interventions, address functional mobility deficits, address ROM deficits, address strength deficits, analyze and address soft tissue restrictions, analyze and cue movement patterns, analyze and modify body mechanics/ergonomics, assess and modify postural abnormalities, address imbalance/dizziness and instruct in home and community integration to attain remaining goals. [x]  See Plan of Care  []  See progress note/recertification  []  See Discharge Summary         Progress towards goals / Updated goals:  Short Term Goals: STG- To be accomplished in 5 treatment(s):  1.  Pt will be independent with HEP to encourage prophylaxis. Eval:dispensed  Last PN MET - 8/19/21 - pt compliant updated this session.   Added adductor inhibition : 8/30/21     Long Term Goals: LTG- To be accomplished in 14 treatment(s):  1.  Pt will improve hip adduction drop to negative to indicate improved femoral-acetabular mobility needed for gait. .  Eval:positive bilaterally   Last PN:MET 8/30/21  Hip Add Drop Test:  right -  Left -   At start of session . continued + PART         2.  Pt will improve hip adduction lift test to 4/5 bilaterally to indicate proper pelvic position needed for walking .  Eval:0/5 bilaterally  Last PN :retest NV   Current: + PART at start of treatment _ at end . Retest ADD lift next visit if PART is _: progressing 9/9/21     3.  Pt will improve SALUD squat to 3/5 or better to indicate LE, pelvic, hip and back mobility needed for daily activities  Eval:SALUD squat 1/5  Last PN :3/5*  Progressing 8/30/21     4.  Pt FOTO score will increase by 10 points to show improvement in function. 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  Progressing 9/16/21       PLAN  []  Upgrade activities as tolerated     [x]  Continue plan of care  []  Update interventions per flow sheet       []  Discharge due to:_  []  Other:_      Timmy Schneider, PTA 9/16/2021  5:16 PM    Future Appointments   Date Time Provider Kevin Newberry   9/23/2021  5:15 PM Anastasia Garvin [facility]   9/28/2021  5:15 PM Gato Crockett, PT, DPT [facility]   10/20/2021  7:40 AM Edenilson Barrett, DIANE Daniel

## 2021-09-21 ENCOUNTER — APPOINTMENT (OUTPATIENT)
Dept: PHYSICAL THERAPY | Age: 31
End: 2021-09-21
Payer: COMMERCIAL

## 2021-09-23 ENCOUNTER — HOSPITAL ENCOUNTER (OUTPATIENT)
Dept: PHYSICAL THERAPY | Age: 31
Discharge: HOME | End: 2021-09-23
Payer: COMMERCIAL

## 2021-09-23 PROCEDURE — 97530 THERAPEUTIC ACTIVITIES: CPT

## 2021-09-23 PROCEDURE — 97140 MANUAL THERAPY 1/> REGIONS: CPT

## 2021-09-23 PROCEDURE — 97112 NEUROMUSCULAR REEDUCATION: CPT

## 2021-09-23 NOTE — PROGRESS NOTES
PT DAILY TREATMENT NOTE    Patient Name: Elliott Ruiz  Date:2021  : 1990  [x]  Patient  Verified  Payor: BLUE CROSS / Plan: Dre Mcnair [id number] PPO / Product Type: PPO /    In time:5:20  Out time:6:30  Total Treatment Time (min): 70  Total Timed Codes (min):70  1:1 Treatment Time ( [address] only): 70   Visit #: 13 of 22    Treatment Dx: Low back pain [M54.5]  Sacroiliac joint pain [M53.3]    SUBJECTIVE  Pain Level (0-10 scale): 3  Any medication changes, allergies to medications, adverse drug reactions, diagnosis change, or new procedure performed?: [x] No    [] Yes (see summary sheet for update)  Subjective functional status/changes:   [] No changes reported  \"I was not feeling good on Friday. I was on a field trip and I had so much pressure on my bladder I thought my infection was coming back. I went to see my doctor and pelvic PT. The infection is not getting worse.  I did not feel better after the manual work \"     OBJECTIVE            30 min Therapeutic Activity:  []  See flow sheet :   Rationale: increase ROM, increase strength, improve coordination, improve balance and increase proprioception  to improve the patients ability to perform pain free ADL's      30 min Neuromuscular Re-education:  []  See flow sheet :   Rationale: increase ROM, increase strength, improve coordination, improve balance and increase proprioception  to improve the patients ability to perform pain free ADL\"s     10 min Manual Therapy:  Bilateral infraclavicular pumping  Cupping to scarl border, SI and lumbar paraspinals L4-T8 in seated balloon breathing  Left posterior hip capsule stretching  STM to bilateral Adductors in hooklying after adductor inhibition   Rationale: decrease pain, increase ROM, increase tissue extensibility, decrease edema , correct positional vertigo, decrease trigger points and increase postural awareness to improve the patients ability to perform pain free ADL's   The manual therapy interventions were performed at a separate and distinct time from the therapeutic activities interventions. With   [x] TE   [] TA   [] neuro   [] other: Patient Education: [x] Review HEP    [] Progressed/Changed HEP based on:   [] positioning   [] body mechanics   [] transfers   [] heat/ice application    [] other:      Other Objective/Functional Measures:   SALUD Special Tests (pre/post)  HGIR:                                                 Right: 70/90             Left: 80/90  SLR    Right: 65/80             Left: 65/80  Hip Add Drop Test:                           Right: +/-            Left:+/-  Trunk Rot                                           Right: 12    Left 13   PART          Right: +              Left: +      Hip IR                                Right: 35            Left: 40  HipER    Right 30  LEft 30         Pain Level (0-10 scale) post treatment: 0    ASSESSMENT/Changes in Function:   Pt presented in therapy with reports of worsening pain over the weekend after 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 Stated increased anterior pelvic pain and pressure. Pt returned to MD, however no increase in infection. Pelvic PT worked on right obturator however no relief. Pt presented in hyper inflated state leaning more to PEC pattern with strong Right BC. Focused on repositioning techniques and facilitation of obliques and hamstrings. Pt is challenged with Hamstring in 90/90 and recruits hip flexors and TFL. Plan to progress to glut and oblique strengthening activity to hold position in upright posture needed for standing and gait.      Patient will continue to benefit from skilled PT services to modify and progress therapeutic interventions, address functional mobility deficits, address ROM deficits, address strength deficits, analyze and address soft tissue restrictions, analyze and cue movement patterns, analyze and modify body mechanics/ergonomics, assess and modify postural abnormalities, address imbalance/dizziness and instruct in home and community integration to attain remaining goals. [x]  See Plan of Care  []  See progress note/recertification  []  See Discharge Summary         Progress towards goals / Updated goals:  Short Term Goals: STG- To be accomplished in 5 treatment(s):  1.  Pt will be independent with HEP to encourage prophylaxis. Eval:dispensed  Last PN MET - 8/19/21 - pt compliant updated this session.   Added adductor inhibition : 8/30/21     Long Term Goals: LTG- To be accomplished in 14 treatment(s):  1.  Pt will improve hip adduction drop to negative to indicate improved femoral-acetabular mobility needed for gait. .  Eval:positive bilaterally   Last PN:MET 8/30/21  Hip Add Drop Test:  right -  Left -   At start of session . continued + PART         2.  Pt will improve hip adduction lift test to 4/5 bilaterally to indicate proper pelvic position needed for walking .  Eval:0/5 bilaterally  Last PN :retest NV   Current: + PART at start of treatment _ at end . Retest ADD lift next visit if PART is _: progressing 9/9/21     3.  Pt will improve SALUD squat to 3/5 or better to indicate LE, pelvic, hip and back mobility needed for daily activities  Eval:SALUD squat 1/5  Last PN :3/5*  Progressing 8/30/21     4.  Pt FOTO score will increase by 10 points to show improvement in function. 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  Pain increased with standing on field trip and stress: progressin g9/23/21    PLAN  []  Upgrade activities as tolerated     [x]  Continue plan of care  []  Update interventions per flow sheet       []  Discharge due to:_  []  Other:_ Reny Ramos, PRESLEY 9/23/2021  5:24 PM    Future Appointments   Date Time Provider Kevin Newberry   9/28/2021  5:15 PM Thien Hung, PT, DPT [facility]   10/20/2021  7:40 AM Ronald Bautista, PT [address] [facility]

## 2021-09-28 ENCOUNTER — HOSPITAL ENCOUNTER (OUTPATIENT)
Dept: PHYSICAL THERAPY | Age: 31
Discharge: HOME | End: 2021-09-28
Payer: COMMERCIAL

## 2021-09-28 PROCEDURE — 97140 MANUAL THERAPY 1/> REGIONS: CPT

## 2021-09-28 PROCEDURE — 97530 THERAPEUTIC ACTIVITIES: CPT

## 2021-09-28 PROCEDURE — 97112 NEUROMUSCULAR REEDUCATION: CPT

## 2021-09-28 NOTE — PROGRESS NOTES
PT DAILY TREATMENT NOTE    Patient Name: Arcadio Said  Date:2021  : 1990  [x]  Patient  Verified  Payor: BLUE CROSS / Plan: Dre Mcnair [id number] PPO / Product Type: PPO /    In time:5:25 pm   Out time:6:15 pm   Total Treatment Time (min): 50  Total Timed Codes (min): 50  1:1 Treatment Time ([facility] only): 50   Visit #: 14 of 22    Treatment Dx: Low back pain [M54.5]  Sacroiliac joint pain [M53.3]    SUBJECTIVE  Pain Level (0-10 scale): 3  Any medication changes, allergies to medications, adverse drug reactions, diagnosis change, or new procedure performed?: [x] No    [] Yes (see summary sheet for update)  Subjective functional status/changes:   [] No changes reported  \"I am ok. Stress level is really high. \"  Reported that feels pain is centralizing to adductors and pubic region. Has found relief with foam rolling adductors, child's pose with heels together and then followed with reclined hamstrings.      OBJECTIVE    12 min Therapeutic Activity:  [x]  See flow sheet :   Rationale: increase ROM, increase strength, improve coordination and increase proprioception  to improve the patients ability to perform daily activities with decreased pain and symptom levels       30 min Neuromuscular Re-education:  [x]  See flow sheet : included vacuum cupping in short seated balloon breathing position    Rationale: increase ROM, increase strength, improve coordination and increase proprioception  to improve the patients ability to perform daily activities with decreased pain and symptom levels      8 min Manual Therapy: Instrument augmented STM to right adductor in supine hooklying position with right glut activation    Rationale: decrease pain, increase ROM, increase tissue extensibility, decrease trigger points and increase postural awareness to improve the patients ability to perform daily activities with decreased pain and symptom levels    The manual therapy interventions were performed at a separate and distinct time from the therapeutic activities interventions. With   [] TE   [] TA   [] neuro   [] other: Patient Education: [x] Review HEP    [] Progressed/Changed HEP based on:   [] positioning   [] body mechanics   [] transfers   [] heat/ice application    [] other:      Other Objective/Functional Measures:   SALUD Special Tests (pre/post)  HGIR:                                                 Right: 75/90             Left: 85/90  Hip Add Drop Test:                           Right: + w/ drop /-       Left:+/-  Trunk Rot                                           Right: 15 in/14 in Left 15 in  /14 in  Shoulder Horizontal Abduction          Right: 45 /NT             Left: 30 /NT  Passive Abduction Raise Test Right: +/-  Left: -    TTP and increased tone at right proximal adductors     Pain Level (0-10 scale) post treatment: 0    ASSESSMENT/Changes in Function:   Pt feels that pain increases as day progresses. Pt tolerated treatment session well. Demonstrated good IO/TA facilitation with S/L hip lift and S/L IO and right glut. Continues to have increased stress levels which is impacting posture and sympathetic reaction. BC cleared nicely with seated balloon, modified all four belly and 90-90 exercises. Patient will continue to benefit from skilled PT services to modify and progress therapeutic interventions, address functional mobility deficits, address ROM deficits, address strength deficits, analyze and address soft tissue restrictions, analyze and cue movement patterns, analyze and modify body mechanics/ergonomics, assess and modify postural abnormalities and instruct in home and community integration to attain remaining goals.      [x]  See Plan of Care  []  See progress note/recertification  []  See Discharge Summary         Progress towards goals / Updated goals:  Short Term Goals: STG- To be accomplished in 5 treatment(s):  1.  Pt will be independent with HEP to encourage prophylaxis. Eval:dispensed  Last PN MET - 8/19/21 - pt compliant updated this session.   Added adductor inhibition : 8/30/21     Long Term Goals: LTG- To be accomplished in 14 treatment(s):  1.  Pt will improve hip adduction drop to negative to indicate improved femoral-acetabular mobility needed for gait. .  Eval:positive bilaterally   Last PN:MET 8/30/21  Hip Add Drop Test:  right -  Left -   At start of session . continued + PART   Current:  Progressing 9/28/21 Hip Add Drop Test:                           Right: + w/ drop /-       Left:+/-          2.  Pt will improve hip adduction lift test to 4/5 bilaterally to indicate proper pelvic position needed for walking .  Eval:0/5 bilaterally  Last PN :retest NV   Current: + PART at start of treatment _ at end . Retest ADD lift next visit if PART is _: progressing 9/9/21     3.  Pt will improve SALUD squat to 3/5 or better to indicate LE, pelvic, hip and back mobility needed for daily activities  Eval:SALUD squat 1/5  Last PN :3/5*  Progressing 8/30/21     4.  Pt FOTO score will increase by 10 points to show improvement in function. 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        PLAN  [x]  Upgrade activities as tolerated     [x]  Continue plan of care  []  Update interventions per flow sheet       []  Discharge due to:_  []  Other:_      Gina Ferguson, PT, DPT 9/28/2021  5:28 PM    Future Appointments   Date Time Provider Kevin Newberry   10/5/2021  5:15 PM Luis Fernando Cabrera, PT, DPT [facility]   10/12/2021  5:15 PM Luis Fernando Cabrera, PT, DPT [facility]   10/20/2021  7:40 AM Nancy Caldwell PT [facility]   10/21/2021  6:00 PM Gisell Muniz PT, SHARRI [facility]PM[facility]   10/28/2021  6:00 PM Gisell Muniz PT, SHARRI RODRIGUEZ [facility]

## 2021-10-05 ENCOUNTER — HOSPITAL ENCOUNTER (OUTPATIENT)
Dept: PHYSICAL THERAPY | Age: 31
Discharge: HOME | End: 2021-10-05
Payer: COMMERCIAL

## 2021-10-05 PROCEDURE — 97140 MANUAL THERAPY 1/> REGIONS: CPT

## 2021-10-05 PROCEDURE — 97530 THERAPEUTIC ACTIVITIES: CPT

## 2021-10-05 PROCEDURE — 97112 NEUROMUSCULAR REEDUCATION: CPT

## 2021-10-05 NOTE — PROGRESS NOTES
[facility] at the [address], [facility] [address] [facility]  Phone: [phone]      Fax:  [phone]    Progress Note  Patient name: Taylor Sloan Start of Care: 2021   Referral source: Almas Anthony MD : 1990               Medical Diagnosis: Low back pain [M54.5]  Sacroiliac joint pain [M53.3]    Onset Date:3 years ago2               Treatment Diagnosis: Lumbopelvic dysfunction    Prior Hospitalization: see medical history Provider#: [id number]   Medications: Verified on Patient summary List    Comorbidities: hx of chronic bladder infection   Prior Level of Function: Increase in pain 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, 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  Pt will be independent with HEP to encourage prophylaxis. Eval:dispensed  Last PN MET - 21 - pt compliant updated this session.   Added adductor inhibition : 21     Long Term Goals: LTG- To be accomplished in 14 treatment(s):  1.  Pt will improve hip adduction drop to negative to indicate improved femoral-acetabular mobility needed for gait. .  Eval:positive bilaterally   Last PN:MET 21  Hip Add Drop Test:  right -  Left -   At start of session . continued + PART   Current:  Progressing 10/5/21  Hip Add Drop Test:   Right: midline/-               Left:midline/-        2.  Pt will improve hip adduction lift test to 4/5 bilaterally to indicate proper pelvic position needed for walking .  Eval:0/5 bilaterally  Last PN :retest NV   Current: + PART at start of treatment _ at end .  Retest ADD lift next visit if PART is _: progressing 10/5/21     3.  Pt will improve SALUD squat to 3/5 or better to indicate LE, pelvic, hip and back mobility needed for daily activities  Eval:SALUD squat 1/5  Last PN :3/5*  Progressing 8/30/21     4.  Pt FOTO score will increase by 10 points to show improvement in function. 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 , did have new onset left sciatic sx resolved with PT       Key Functional Changes:   Pt has been seen in therapy 15 visits including initial eval with C/C of LBP and SIJ pain. Pt reports 3 year hx of SIJ and LBP coinciding with chronic low grade bladder infection that onset ~3 years ago. Pt is currently being seen 1 visit per month for pelvic floor PT with dry needling. At present current signs/symptoms consistent with lumbo-pelvic dysfunction and SIJ dysfunction. Pt has reported improvement in sx's since starting therapy. Pt has reported increased ability to tolerate daily activities and overall reduction in pain level. Has reported increased ability to manage pain and decrease with home exercises. Has also reported times with no pain. Pt continues to demonstrated poor squat mechanics and rotated franck pelvis with weakened and lengthened obliques, and hamstrings with increased tone in bilateral adductors that appear to be maintaining an anteriorly tipped pelvis.  Have been focussing on IO/TA recruitment and have added in right glut max facilitation.  \\    Updated Goals: to be achieved in 10 treatments:   Same as above     ASSESSMENT/RECOMMENDATIONS:  [x]Continue therapy per initial plan/protocol at a frequency of  10 treatments  []Continue therapy with the following recommended changes:_____________________ _____________________________________________________________________  []Discontinue therapy progressing towards or have reached established goals  []Discontinue therapy due to lack of appreciable progress towards goals  []Discontinue therapy due to lack of attendance or compliance  []Await Physician's recommendations/decisions regarding therapy  []Other:________________________________________________________________    Thank you for this referral.   Aryan Gardner PT, DPT 10/5/2021 7:42 PM

## 2021-10-05 NOTE — PROGRESS NOTES
PT DAILY TREATMENT NOTE    Patient Name: Get Phlegm  Date:10/5/2021  : 1990  [x]  Patient  Verified  Payor: Snow Junior / Plan: Dre Mcnair [id number] PPO / Product Type: PPO /    In time:5:20 pm  Out time: 6:18 pm   Total Treatment Time (min): 58  Total Timed Codes (min): 53 (filled out FOTO independently at start of session)  1:1 Treatment Time (MC/BCBS only): 53   Visit #: 15 of 22    Treatment Dx: Low back pain [M54.5]  Sacroiliac joint pain [M53.3]    SUBJECTIVE  Pain Level (0-10 scale): 4  Any medication changes, allergies to medications, adverse drug reactions, diagnosis change, or new procedure performed?: [x] No    [] Yes (see summary sheet for update)  Subjective functional status/changes:   [] No changes reported  \"So I have different symptoms. \"  Reported new onset over weekend of left sided \"sciatic\" type sx into left posterior leg to calf, \"feels like hot liquid\"  Overall decreased right anterior pain     OBJECTIVE    20 min Therapeutic Activity:  [x]  See flow sheet :   Rationale: increase ROM, increase strength, improve coordination and increase proprioception  to improve the patients ability to perform daily activities with decreased pain and symptom levels       30 min Neuromuscular Re-education:  [x]  See flow sheet :included vacuum cupping  In child's pose    Rationale: increase ROM, increase strength, improve coordination and increase proprioception  to improve the patients ability to perform daily activities with decreased pain and symptom levels      8 min Manual Therapy:  Instrument augmented STM to right adductor in supine hooklying position with right glut activation    Rationale: decrease pain, increase ROM, increase tissue extensibility, decrease trigger points and increase postural awareness to improve the patients ability to perform daily activities with decreased pain and symptom levels    The manual therapy interventions were performed at a separate and distinct time from the therapeutic activities interventions. With   [] TE   [] TA   [] neuro   [] other: Patient Education: [x] Review HEP    [] Progressed/Changed HEP based on:   [] positioning   [] body mechanics   [] transfers   [] heat/ice application    [] other:      Other Objective/Functional Measures:   SALUD Special Tests (pre/post)  HGIR:                                                 Right: 74/90             Left: 90/90  Hip Add Drop Test:                           Right: midline/-            Left:midline/-  Trunk Rot                                           Right: 15.5 in/1NT Left 15 in  /NT  Passive Abduction Raise Test Right: -   Left: +/-    Pain Level (0-10 scale) post treatment: 0    ASSESSMENT/Changes in Function:   Pt has been seen in therapy 15 visits including initial eval with C/C of LBP and SIJ pain. Pt reports 3 year hx of SIJ and LBP coinciding with chronic low grade bladder infection that onset ~3 years ago. Pt is currently being seen 1 visit per month for pelvic floor PT with dry needling. At present current signs/symptoms consistent with lumbo-pelvic dysfunction and SIJ dysfunction. Pt has reported improvement in sx's since starting therapy. Pt has reported increased ability to tolerate daily activities and overall reduction in pain level. Has reported increased ability to manage pain and decrease with home exercises. Has also reported times with no pain. Pt continues to demonstrated poor squat mechanics and rotated franck pelvis with weakened and lengthened obliques, and hamstrings with increased tone in bilateral adductors that appear to be maintaining an anteriorly tipped pelvis.  Have been focussing on IO/TA recruitment and have added in right glut max facilitation.      Pt reported tension in adductors contributing to lower abdominal pain and pressure. Has experienced relief with foam rolling to adductors.     Patient will continue to benefit from skilled PT services to modify and progress therapeutic interventions, address functional mobility deficits, address ROM deficits, address strength deficits, analyze and address soft tissue restrictions, analyze and cue movement patterns, analyze and modify body mechanics/ergonomics, assess and modify postural abnormalities and instruct in home and community integration to attain remaining goals. [x]  See Plan of Care  [x]  See progress note/recertification  []  See Discharge Summary         Progress towards goals / Updated goals:  Short Term Goals: STG- To be accomplished in 5 treatment(s):  1.  Pt will be independent with HEP to encourage prophylaxis. Eval:dispensed  Last PN MET - 8/19/21 - pt compliant updated this session.   Added adductor inhibition : 8/30/21     Long Term Goals: LTG- To be accomplished in 14 treatment(s):  1.  Pt will improve hip adduction drop to negative to indicate improved femoral-acetabular mobility needed for gait. .  Eval:positive bilaterally   Last PN:MET 8/30/21  Hip Add Drop Test:  right -  Left -   At start of session . continued + PART   Current:  Progressing 10/5/21  Hip Add Drop Test:   Right: midline/-            Left:midline/-        2.  Pt will improve hip adduction lift test to 4/5 bilaterally to indicate proper pelvic position needed for walking .  Eval:0/5 bilaterally  Last PN :retest NV   Current: + PART at start of treatment _ at end . Retest ADD lift next visit if PART is _: progressing 10/5/21     3.  Pt will improve SALUD squat to 3/5 or better to indicate LE, pelvic, hip and back mobility needed for daily activities  Eval:SALUD squat 1/5  Last PN :3/5*  Progressing 8/30/21     4.  Pt FOTO score will increase by 10 points to show improvement in function. 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 , did have new onset left sciatic sx resolved with PT          PLAN  [x]  Upgrade activities as tolerated     [x]  Continue plan of care  []  Update interventions per flow sheet       []  Discharge due to:_  []  Other:_      Marybel Acosta, PT, DPT 10/5/2021  5:02 PM    Future Appointments   Date Time Provider Kevin Newberry   10/5/2021  5:15 PM Laurel Martinez PT, DPT [facility]   10/12/2021  5:15 PM Laurel Martinez PT, DPT [facility]   10/20/2021  7:40 AM DIANE Flowers   10/21/2021  6:00 PM Laurel Martinez PT, DPT [facility]   10/28/2021  6:00 PM Laurel Martinez PT, DPT [facility]

## 2021-10-12 ENCOUNTER — HOSPITAL ENCOUNTER (OUTPATIENT)
Dept: PHYSICAL THERAPY | Age: 31
Discharge: HOME | End: 2021-10-12
Payer: COMMERCIAL

## 2021-10-12 PROCEDURE — 97530 THERAPEUTIC ACTIVITIES: CPT

## 2021-10-12 PROCEDURE — 97112 NEUROMUSCULAR REEDUCATION: CPT

## 2021-10-12 NOTE — PROGRESS NOTES
PT DAILY TREATMENT NOTE    Patient Name: Gloria Olmedo  Date:10/12/2021  : 1990  [x]  Patient  Verified  Payor: BLUE CROSS / Plan: Dre Mcnair [id number] PPO / Product Type: PPO /    In time:5:22 pm  Out time:6:17  pm  Total Treatment Time (min): 55  Total Timed Codes (min): 55  1:1 Treatment Time (MC/BCBS only): 55   Visit #: 16 of 25    Treatment Dx: Low back pain [M54.5]  Sacroiliac joint pain [M53.3]    SUBJECTIVE  Pain Level (0-10 scale): 3-4  Any medication changes, allergies to medications, adverse drug reactions, diagnosis change, or new procedure performed?: [x] No    [] Yes (see summary sheet for update)  Subjective functional status/changes:   [] No changes reported  \"The left sided pain comes and goes. It seems to be more when I am sitting. \"  Reported walked over 13,000 steps on Saturday and was not in apin just more tired. Has had some increase in heel pain.      OBJECTIVE    17 min Therapeutic Activity:  [x]  See flow sheet :   Rationale: increase ROM, increase strength, improve coordination and increase proprioception  to improve the patients ability to perform daily activities with decreased pain and symptom levels       38 min Neuromuscular Re-education:  [x]  See flow sheet :included vacuum cupping  In child's pose to inhibit paraspinals with cushion to facilitate hamstrings    Rationale: increase ROM, increase strength, improve coordination and increase proprioception  to improve the patients ability to perform daily activities with decreased pain and symptom levels          With   [] TE   [] TA   [] neuro   [] other: Patient Education: [x] Review HEP    [] Progressed/Changed HEP based on:   [] positioning   [] body mechanics   [] transfers   [] heat/ice application    [] other:      Other Objective/Functional Measures:   SALUD Special Tests (pre/post)  HGIR:                                                 Right: 85/90             Left: 90  Hip Add Drop Test: Right: midline/-         Left:+ w/ drop/-  Trunk Rot                                            Right: 14 in  Left 14 in   Shoulder Horizontal Abduction           Right: [facility]              Left: 29 /39  Passive Abduction Raise  Right:-   Left: +/-  Hip Adduction Lift   Right: 2-3/5  Left: 2/5    Pain Level (0-10 scale) post treatment: 0    ASSESSMENT/Changes in Function:   Pt responded very well to treatment this session. Focussed on coordinating left IO/TA and right glut. Very challenged with knee toward knee. Was able to progress to standing exercises including standing right glut max with good tolerance - discussed integrating right glut into HEP ~3 days per week. Reported feels left posterior hip/SI most with driving, reviewed reference centers with sitting and driving. Patient will continue to benefit from skilled PT services to modify and progress therapeutic interventions, address functional mobility deficits, address ROM deficits, address strength deficits, analyze and address soft tissue restrictions, analyze and cue movement patterns, analyze and modify body mechanics/ergonomics, assess and modify postural abnormalities and instruct in home and community integration to attain remaining goals. [x]  See Plan of Care  []  See progress note/recertification  []  See Discharge Summary         Progress towards goals / Updated goals:  Short Term Goals: STG- To be accomplished in 5 treatment(s):  1.  Pt will be independent with HEP to encourage prophylaxis.   Eval:dispensed  Last PN MET - 8/19/21 - pt compliant updated this session.   Added adductor inhibition : 8/30/21     Long Term Goals: LTG- To be accomplished in 14 treatment(s):  1.  Pt will improve hip adduction drop to negative to indicate improved femoral-acetabular mobility needed for gait. .  Eval:positive bilaterally   Last PN: t:  Progressing 10/5/21  Hip Add Drop Test:   Right: midline/-               Left:midline/-        2.  Pt will improve hip adduction lift test to 4/5 bilaterally to indicate proper pelvic position needed for walking .  Eval:0/5 bilaterally  Last PN + PART at start of treatment _ at end . Retest ADD lift next visit if PART is _: progressing 10/5/21  Current: 2/5 adduction lift test bilaterally      3.  Pt will improve SALUD squat to 3/5 or better to indicate LE, pelvic, hip and back mobility needed for daily activities  Eval:SALUD squat 1/5  Last PN :3/5*  Progressing 8/30/21     4.  Pt FOTO score will increase by 10 points to show improvement in function. 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 , did have new onset left sciatic sx resolved with PT    PLAN  [x]  Upgrade activities as tolerated     [x]  Continue plan of care  []  Update interventions per flow sheet       []  Discharge due to:_  []  Other:_      Denae Negro, PT, DPT 10/12/2021  5:18 PM    Future Appointments   Date Time Provider Kevin Newberry   10/20/2021  7:40 AM DIANE Carpenter   10/21/2021  6:00 PM Leslie RODRIGUEZ [facility]   10/28/2021  6:00 PM Lina Claros, PT, DPT [facility]

## 2021-10-21 ENCOUNTER — HOSPITAL ENCOUNTER (OUTPATIENT)
Dept: PHYSICAL THERAPY | Age: 31
Discharge: HOME | End: 2021-10-21
Payer: COMMERCIAL

## 2021-10-21 PROCEDURE — 97112 NEUROMUSCULAR REEDUCATION: CPT

## 2021-10-21 PROCEDURE — 97110 THERAPEUTIC EXERCISES: CPT

## 2021-10-21 PROCEDURE — 97530 THERAPEUTIC ACTIVITIES: CPT

## 2021-10-21 NOTE — PROGRESS NOTES
PT DAILY TREATMENT NOTE    Patient Name: Francis Graham  Date:10/21/2021  : 1990  [x]  Patient  Verified  Payor: BLUE CROSS / Plan: Dre Mcnair [id number] PPO / Product Type: PPO /    In time:6:15  Out time:7:15  Total Treatment Time (min): 60  Total Timed Codes (min): 60  1:1 Treatment Time (MC/BCBS only): 60   Visit #: 16 of 25    Treatment Dx: Low back pain [M54.5]  Sacroiliac joint pain [M53.3]    SUBJECTIVE  Pain Level (0-10 scale): 3  Any medication changes, allergies to medications, adverse drug reactions, diagnosis change, or new procedure performed?: [x] No    [] Yes (see summary sheet for update)  Subjective functional status/changes:   [] No changes reported  Pt reported changing pain to adductor and anterior pelvis. Stated she had a flare up but \" I bounced back quickly. \"     OBJECTIVE        30 min Therapeutic Activity:  [x]  See flow sheet :   Rationale: increase ROM, increase strength, improve coordination, improve balance and increase proprioception  to improve the patients ability to perform pain free ADL's      30 min Neuromuscular Re-education:  [x]  See flow sheet :   Rationale: increase ROM, increase strength, improve coordination, improve balance and increase proprioception  to improve the patients ability to perform pain free ADL's           With   [] TE   [x] TA   [] neuro   [] other: Patient Education: [x] Review HEP    [] Progressed/Changed HEP based on:   [] positioning   [] body mechanics   [] transfers   [] heat/ice application    [] other:      Other Objective/Functional Measures:   SALUD Special Tests (pre/post)  HGIR:                           Right: 60             Left: 60  SLR   Right: 80             Left: 80  Hip Add Drop Test:     Right: +/-            Left:+/-  Trunk Rot                   \Right: 15    Left 15 /14     Hip IR           Right: 40/40            Left: 35/40  Hip ER             Right :           Left: 30/30           Pain Level (0-10 scale) post treatment: 2    ASSESSMENT/Changes in Function:   Pt presented in therapy after travel with reports of flare up of sx's. However stated sx's improved in less than 24 hours. Pt continues to report anterior pelvic tension and bilateral adductor tension. Pelvic therapist stated that pt was able to relax pelvic floor, and will focus on retraining and strengthening. Added glut and oblique strengthening exercise this session including hip thrusts, hip lifts and paraspinal release. Pt reported decreased tension after exercises. Patient will continue to benefit from skilled PT services to modify and progress therapeutic interventions, address functional mobility deficits, address ROM deficits, address strength deficits, analyze and address soft tissue restrictions, analyze and cue movement patterns, analyze and modify body mechanics/ergonomics, assess and modify postural abnormalities, address imbalance/dizziness and instruct in home and community integration to attain remaining goals. [x]  See Plan of Care  []  See progress note/recertification  []  See Discharge Summary         Progress towards goals / Updated goals:    Short Term Goals: STG- To be accomplished in 5 treatment(s):  1.  Pt will be independent with HEP to encourage prophylaxis.   Eval:dispensed  Last PN MET - 8/19/21 - pt compliant updated this session.   Added adductor inhibition : 8/30/21     Long Term Goals: LTG- To be accomplished in 14 treatment(s):  1.  Pt will improve hip adduction drop to negative to indicate improved femoral-acetabular mobility needed for gait. .  Eval:positive bilaterally   Last PN: t:  Progressing 10/5/21  Hip Add Drop Test:   Right: midline/-               Left:midline/-  Current; Right past  midline/ -  Left past midline /- :progressing 10/21/21        2.  Pt will improve hip adduction lift test to 4/5 bilaterally to indicate proper pelvic position needed for walking .  Eval:0/5 bilaterally  Last PN + PART at start of treatment _ at end . Retest ADD lift next visit if PART is _: progressing 10/5/21  Current: 2/5 adduction lift test bilaterally      3.  Pt will improve SALUD squat to 3/5 or better to indicate LE, pelvic, hip and back mobility needed for daily activities  Eval:SALUD squat 1/5  Last PN :3/5*  Progressing 8/30/21     4.  Pt FOTO score will increase by 10 points to show improvement in function. 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 , did have new onset left sciatic sx resolved with PT  Current: Pt reported flare up with travel however was able to respond to HEP: progressing 10/21/21     PLAN  []  Upgrade activities as tolerated     [x]  Continue plan of care  []  Update interventions per flow sheet       []  Discharge due to:_  []  Other:_      Kris Leos PTA 10/21/2021  6:20 PM    Future Appointments   Date Time Provider Kevin Newberry   10/28/2021  6:00 PM Hunter Alexis [facility]   12/21/2021  7:00 AM Puma CHONG, [address] [place] [address]

## 2021-10-26 ENCOUNTER — HOSPITAL ENCOUNTER (OUTPATIENT)
Dept: PHYSICAL THERAPY | Age: 31
Discharge: HOME | End: 2021-10-26
Payer: COMMERCIAL

## 2021-10-26 PROCEDURE — 97112 NEUROMUSCULAR REEDUCATION: CPT

## 2021-10-26 PROCEDURE — 97530 THERAPEUTIC ACTIVITIES: CPT

## 2021-10-26 NOTE — PROGRESS NOTES
PT DAILY TREATMENT NOTE    Patient Name: Haylee Gr  Date:10/26/2021  : 1990  [x]  Patient  Verified  Payor: BLUE ALEJA / Plan: Dre Mcnair [id number] PPO / Product Type: PPO /    In time:6:10 pm  Out time:7:15 pm   Total Treatment Time (min): 65  Total Timed Codes (min): 65  1:1 Treatment Time (MC/BCBS only): 60   Visit #: 18 of 25    Treatment Dx: Low back pain [M54.5]  Sacroiliac joint pain [M53.3]    SUBJECTIVE  Pain Level (0-10 scale): 3  Any medication changes, allergies to medications, adverse drug reactions, diagnosis change, or new procedure performed?: [x] No    [] Yes (see summary sheet for update)  Subjective functional status/changes:   [] No changes reported  \"Ok. \"  Pt reported had increase in pain over weekend unsure if bladder pain or pelvis pain - performed all HEP and felt \"much better\"   Woke uop this morning and had increased pain levels - performed a couple HEP and as day progressed pain decreased  Now has pain bilateral posterior hip muscles, greater trochanter and ITB    OBJECTIVE    25 min Therapeutic Activity:  [x]  See flow sheet :   Rationale: increase ROM, increase strength, improve coordination and increase proprioception  to improve the patients ability to perform daily activities with decreased pain and symptom levels       40 min Neuromuscular Re-education:  [x]  See flow sheet :   Rationale: increase ROM, increase strength, improve coordination and increase proprioception  to improve the patients ability to perform daily activities with decreased pain and symptom levels          With   [] TE   [] TA   [] neuro   [] other: Patient Education: [x] Review HEP    [] Progressed/Changed HEP based on:   [] positioning   [] body mechanics   [] transfers   [] heat/ice application    [] other:      Other Objective/Functional Measures:   SALUD Special Tests (pre/post)  HGIR:                                                 Right: 90             Left: 90  Hip Add Drop Test: Right: -              Left:past midline/-  Passive Abduction Raise Test Right: -   Left: -  Pelvic [place] Drop   Right: -   Left: within 2 in of table   Trunk Rot                                           Right: 14 in   Left 14 in   Shoulder Horizontal Abduction          Right: WFL              Left: NT        Pain Level (0-10 scale) post treatment: 2    ASSESSMENT/Changes in Function:   Pt reported that had what thought was flare up of bladder pain over weekend, but pain was decreased with HEP. Focussed this session on condensing HEP - updated this session. Patient will continue to benefit from skilled PT services to modify and progress therapeutic interventions, address functional mobility deficits, address ROM deficits, address strength deficits, analyze and address soft tissue restrictions, analyze and cue movement patterns, analyze and modify body mechanics/ergonomics, assess and modify postural abnormalities and instruct in home and community integration to attain remaining goals. [x]  See Plan of Care  []  See progress note/recertification  []  See Discharge Summary         Progress towards goals / Updated goals:  Short Term Goals: STG- To be accomplished in 5 treatment(s):  1.  Pt will be independent with HEP to encourage prophylaxis.   Eval:dispensed  Last PN MET - 8/19/21 - pt compliant updated this session.   Added adductor inhibition : 8/30/21     Long Term Goals: LTG- To be accomplished in 14 treatment(s):  1.  Pt will improve hip adduction drop to negative to indicate improved femoral-acetabular mobility needed for gait. .  Eval:positive bilaterally   Last PN: t:  Progressing 10/5/21  Hip Add Drop Test:   Right: midline/-               Left:midline/-  Current; Right past  midline/ -  Left past midline /- :progressing 10/21/21        2.  Pt will improve hip adduction lift test to 4/5 bilaterally to indicate proper pelvic position needed for walking .  Eval:0/5 bilaterally  Last PN + PART at start of treatment _ at end . Retest ADD lift next visit if PART is _: progressing 10/5/21  Current: 2/5 adduction lift test bilaterally - 10/26/21      3.  Pt will improve SALUD squat to 3/5 or better to indicate LE, pelvic, hip and back mobility needed for daily activities  Eval:SALUD squat 1/5  Last PN :3/5*  Progressing 8/30/21     4.  Pt FOTO score will increase by 10 points to show improvement in function. 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 , did have new onset left sciatic sx resolved with PT  Current: Pt reported flare up with travel however was able to respond to HEP: progressing 10/21/21       PLAN  []  Upgrade activities as tolerated     [x]  Continue plan of care  []  Update interventions per flow sheet       []  Discharge due to:_  []  Other:_      Jose Francisco Stone, PT, DPT 10/26/2021  5:28 PM    Future Appointments   Date Time Provider Kevin Newberry   10/26/2021  6:00 PM Howard Tucker, PT, DPT [facility]   11/4/2021  4:30 PM Verlandy Richard, PTA [facility]   11/9/2021  5:15 PM Howard Tucker PT, DPT [facility]   11/18/2021  5:15 PM Vernafay Richard, PTA [facility]   11/22/2021  9:20 AM DIANE Cruz   11/23/2021  4:30 PM Howard Tucker, PT, DPT [facility]   11/30/2021  5:15 PM Howard Tucker, PT, DPT [facility]   12/7/2021  5:15 PM Howard Tucker[facility]   12/14/2021  5:15 PM Howard Tucker PT, DPT [facility]   12/21/2021  7:00 AM Thomas Billingsley PT [facility]   12/21/2021  5:15 PM Howard Tucker PT, DPT [facility]

## 2021-11-04 ENCOUNTER — HOSPITAL ENCOUNTER (OUTPATIENT)
Dept: PHYSICAL THERAPY | Age: 31
Discharge: HOME | End: 2021-11-04
Payer: COMMERCIAL

## 2021-11-04 PROCEDURE — 97530 THERAPEUTIC ACTIVITIES: CPT

## 2021-11-04 PROCEDURE — 97112 NEUROMUSCULAR REEDUCATION: CPT

## 2021-11-04 NOTE — PROGRESS NOTES
PT DAILY TREATMENT NOTE    Patient Name: Brendon Delarosa  Date:2021  : 1990  [x]  Patient  Verified  Payor: Fayetta Hamman / Plan: Dre Mcnair [id number] PPO / Product Type: PPO /    In time:4:37  Out time:5:30  Total Treatment Time (min): 53  Total Timed Codes (min): 53  1:1 Treatment Time (MC/BCBS only): 53   Visit #: 19 of 25    Treatment Dx: Other low back pain [M54.59]  SI (sacroiliac) joint dysfunction [M53.3]    SUBJECTIVE  Pain Level (0-10 scale): 3-4  Any medication changes, allergies to medications, adverse drug reactions, diagnosis change, or new procedure performed?: [x] No    [] Yes (see summary sheet for update)  Subjective functional status/changes:   [] No changes reported  \"I am ok. The weekend was fine , but Monday night it started to get tight in the lower abs and inner thighs and both hips. It diminished when I woke up the next day. But then through out the day the right side felt like a muscle tightened up in the hip.  \"    OBJECTIVE          15 min Therapeutic Activity:  []  See flow sheet :   Rationale: increase ROM, increase strength, improve coordination and improve balance  to improve the patients ability to perform pain free ADL's      38 min Neuromuscular Re-education:  []  See flow sheet :   Rationale: increase ROM, increase strength, improve coordination, improve balance and increase proprioception  to improve the patients ability to perform pain free ADL's                 With   [x] TE   [] TA   [] neuro   [] other: Patient Education: [x] Review HEP    [] Progressed/Changed HEP based on:   [] positioning   [] body mechanics   [] transfers   [] heat/ice application    [] other:      Other Objective/Functional Measures:   SALUD Special Tests (pre/post)  HGIR:                                                 Right: 90             Left: 90    Hip Add Drop Test:                           Right: -            Left:-     Hip IR                                 Right: 30 Left: 35  Hip ER    Right 40 Left 35       Pain Level (0-10 scale) post treatment: 2    ASSESSMENT/Changes in Function:   Pt presented in clinic with minimal pain however reported significant increase in sx's on Monday with no know cause. Stated pain in low abdomen, and inner thighs as well as a pulling into right hip. However pt reported sx's decreasing with HEP and stated that her cycle started shortly after , then sx's decreased. Discussed possible relation of increased relax and pelvic instability contributing to pain. Pt will discuss with MD at her appointment. Also discussed possibly talking to MD about adding topical estrogen for management of bacterial infection. Patient will continue to benefit from skilled PT services to modify and progress therapeutic interventions, address functional mobility deficits, address ROM deficits, address strength deficits, analyze and address soft tissue restrictions, analyze and cue movement patterns, analyze and modify body mechanics/ergonomics, assess and modify postural abnormalities, address imbalance/dizziness and instruct in home and community integration to attain remaining goals. [x]  See Plan of Care  []  See progress note/recertification  []  See Discharge Summary         Progress towards goals / Updated goals:    Short Term Goals: STG- To be accomplished in 5 treatment(s):  1.  Pt will be independent with HEP to encourage prophylaxis.   Eval:dispensed  Last PN MET - 8/19/21 - pt compliant updated this session.   Added adductor inhibition : 8/30/21  Current: Pt reviewed and feels comfortable with HEP : progressing 11/4/21     Long Term Goals: LTG- To be accomplished in 14 treatment(s):  1.  Pt will improve hip adduction drop to negative to indicate improved femoral-acetabular mobility needed for gait. .  Eval:positive bilaterally   Last PN: t:  Progressing 10/5/21  Hip Add Drop Test:   Right: midline/-               Left:midline/-  Current; Right - left - :progressing 11/4/21        2.  Pt will improve hip adduction lift test to 4/5 bilaterally to indicate proper pelvic position needed for walking .  Eval:0/5 bilaterally  Last PN + PART at start of treatment _ at end . Retest ADD lift next visit if PART is _: progressing 10/5/21  Current: 2/5 adduction lift test bilaterally - 10/26/21      3.  Pt will improve SALUD squat to 3/5 or better to indicate LE, pelvic, hip and back mobility needed for daily activities  Eval:SALUD squat 1/5  Last PN :3/5*  Progressing 8/30/21     4.  Pt FOTO score will increase by 10 points to show improvement in function. 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 , did have new onset left sciatic sx resolved with PT  Current: Pt reported flare up with travel however was able to respond to HEP: progressing 10/21/21        PLAN  []  Upgrade activities as tolerated     [x]  Continue plan of care  []  Update interventions per flow sheet       []  Discharge due to:_  []  Other:_      [place] PRESLEY Daniel 11/4/2021  1:40 PM    Future Appointments   Date Time Provider [place] New[place]   11/4/2021  4:30 PM Shelly Conrad [facility]   11/9/2021  5:15 PM iGsell Muniz PT, DPT [facility]   11/18/2021  5:15 PM Maciej Mchugh PTA [facility]   11/22/2021  9:20 AM DIANE Guaman   11/23/2021  4:30 PM Gisell Muniz PT, DPT [facility] THE [facility]   11/30/2021  5:15 PM Gisell Muniz PT, DPT [facility]   12/7/2021  5:15 PM Gisell Muniz PT, DPT [facility]   12/14/2021  5:15 PM Gisell Muniz PT, DPT [facility]   12/21/2021  7:00 AM DIANE Guaman   12/21/2021  5:15 PM Gisell Muniz PT, DPT [facility]

## 2021-11-05 NOTE — PROGRESS NOTES
[facility] at the [address], [place], [address] [facility]  Phone: [phone]      Fax:  [phone]    Progress Note  Patient name: Taylor Jordan Start of Care: 2021   Referral source: Roshni Anthony MD : 1990               Medical Diagnosis: Low back pain [M54.5]  Sacroiliac joint pain [M53.3]    Onset Date:3 years ago2               Treatment Diagnosis: Lumbopelvic dysfunction    Prior Hospitalization: see medical history Provider#: [id number]   Medications: Verified on Patient summary List    Comorbidities: hx of chronic bladder infection   Prior Level of Function: Increase in pain 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, 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  Pt will be independent with HEP to encourage prophylaxis. Eval:dispensed  Last PN MET - 21 - pt compliant updated this session.   Added adductor inhibition : 21  Current: Pt reviewed and feels comfortable with HEP and HEP has been successful with decreasing sx's during flare up : progressing 21     Long Term Goals: LTG- To be accomplished in 14 treatment(s):  1.  Pt will improve hip adduction drop to negative to indicate improved femoral-acetabular mobility needed for gait. .  Eval:positive bilaterally   Last PN: t:  Progressing 10/5/21  Hip Add Drop Test:   Right: midline/-               Left:midline/-  Current; Right -; left - :MET 21        2.  Pt will improve hip adduction lift test to 4/5 bilaterally to indicate proper pelvic position needed for walking .  Eval:0/5 bilaterally  Last PN + PART at start of treatment _ at end .  Retest ADD lift next visit if PART is _: progressing 10/5/21  Current: 2/5 adduction lift test bilaterally - 10/26/21      3.  Pt will improve SALUD squat to 3/5 or better to indicate LE, pelvic, hip and back mobility needed for daily activities  Eval:SALUD squat 1/5  Last PN :3/5*  Progressing 8/30/21     4.  Pt FOTO score will increase by 10 points to show improvement in function. 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 , did have new onset left sciatic sx resolved with PT  Current: Pt reported flare up after standing at work all day however sx's decreased after HEP. Also noted overall improvement with standing however flare ups seem to coincide with the start of her cycle.: progressing well 11/5/21    Key Functional Changes:   Pt has been seen in therapy 19x including initial eval. Pt reported       Pt reports 3 year hx of SIJ and LBP coinciding with chronic low grade bladder infection that onset ~3 years ago. Pt is currently being seen 1 visit per month for pelvic floor PT with dry needling. At present current signs/symptoms consistent with lumbo-pelvic dysfunction and SIJ dysfunction, with pain into lower abdomen , bilateral adductors and right SI.  Pt has reported significant  improvement in sx's since starting therapy and  reported increased ability to tolerate daily activities and overall reduction in pain level. Pt has also reported ability to self manage pain and sx's during a \"flare up\" with performing HEP.  Pt has reported increased frequency of pain free days, and has recently noticed a correlation of increased sx's with onset of cycle.      Pt continues to demonstrated poor squat mechanics and rotated franck pelvis with weakened and lengthened obliques, and hamstrings with increased tone in bilateral adductors that appear to be maintaining an anteriorly tipped pelvis.  Physical therapy treatment  continues to  focus on IO/TA, hamstring and glut max recruitment , as well and synchronizing pelvic and respiratory diaphragms For pelvic stabilization as well as pelvic floor muscle support and relaxation to allow for full bladder voiding.   Pt would benefit from continued therapy to address remaining deficits, and promote pain free performance of ADL's      Updated Goals: to be achieved in  10 treatments:   Same as above   ASSESSMENT/RECOMMENDATIONS:  [x]Continue therapy per initial plan/protocol at a frequency of  10 treatments   []Continue therapy with the following recommended changes:_____________________      _____________________________________________________________________  []Discontinue therapy progressing towards or have reached established goals  []Discontinue therapy due to lack of appreciable progress towards goals  []Discontinue therapy due to lack of attendance or compliance  []Await Physician's recommendations/decisions regarding therapy  []Other:________________________________________________________________    Thank you for this referral.   Aayush Quintana, PTA 11/5/2021 11:10 AM

## 2021-11-09 ENCOUNTER — HOSPITAL ENCOUNTER (OUTPATIENT)
Dept: PHYSICAL THERAPY | Age: 31
Discharge: HOME | End: 2021-11-09
Payer: COMMERCIAL

## 2021-11-09 PROCEDURE — 97530 THERAPEUTIC ACTIVITIES: CPT

## 2021-11-09 PROCEDURE — 97112 NEUROMUSCULAR REEDUCATION: CPT

## 2021-11-09 NOTE — PROGRESS NOTES
PT DAILY TREATMENT NOTE    Patient Name: Reid Phillips  Date:2021  : 1990  [x]  Patient  Verified  Payor: Orestes Amaral / Plan: Dre Mcnair [id number] PPO / Product Type: PPO /    In time:5:21 pm  Out time:6:15 pm   Total Treatment Time (min): 54  Total Timed Codes (min): 54  1:1 Treatment Time (MC/BCBS only): 54   Visit #: 20 of 29    Treatment Dx: Other low back pain [M54.59]  SI (sacroiliac) joint dysfunction [M53.3]    SUBJECTIVE  Pain Level (0-10 scale): 3-4  Any medication changes, allergies to medications, adverse drug reactions, diagnosis change, or new procedure performed?: [x] No    [] Yes (see summary sheet for update)  Subjective functional status/changes:   [] No changes reported  \"My normal pain, no better no better no worse. \"  Reported left lateral hip soreness after last session into left ITB.     OBJECTIVE    24 min Therapeutic Activity:  [x]  See flow sheet :   Rationale: increase ROM, increase strength, improve coordination and increase proprioception  to improve the patients ability to perform daily activities with decreased pain and symptom levels       30 min Neuromuscular Re-education:  [x]  See flow sheet :   Rationale: increase ROM, increase strength, improve coordination and increase proprioception  to improve the patients ability to perform daily activities with decreased pain and symptom levels            With   [] TE   [] TA   [] neuro   [] other: Patient Education: [x] Review HEP    [] Progressed/Changed HEP based on:   [] positioning   [] body mechanics   [] transfers   [] heat/ice application    [] other:      Other Objective/Functional Measures:   SALUD Special Tests (pre/post)  HGIR:                                                 Right: 90             Left: /90  Hip Add Drop Test:                           Right: -/-            Left: past midline/-  Passive Abduction Raise  Right: -   Left: -   Trunk Rot                                           Right: 15 in/14 in Left 15.5 in /14 in  Shoulder Horizontal Abduction          Right: 40 /50             Left: 31 /45       Pain Level (0-10 scale) post treatment: 1    ASSESSMENT/Changes in Function:   Pt tolerated session very well. Focussed treatment on left abs, left posterior hip capsule inhibition and right anterior outlet inhibition with iliac depression. Responded best to stair descents and standing post med exp. Discussed with pt that has more pain in the morning and morning at work is very stressful. Thinking there is a correlation between increased stress and increased pain. Reported that has been trying to take a moment for breathing which has helped. Is alos trying more natural way for hormone regulation. Patient will continue to benefit from skilled PT services to modify and progress therapeutic interventions, address functional mobility deficits, address ROM deficits, address strength deficits, analyze and address soft tissue restrictions, analyze and cue movement patterns, analyze and modify body mechanics/ergonomics, assess and modify postural abnormalities and instruct in home and community integration to attain remaining goals. [x]  See Plan of Care  []  See progress note/recertification  []  See Discharge Summary         Progress towards goals / Updated goals:  Short Term Goals: STG- To be accomplished in 5 treatment(s):  1.  Pt will be independent with HEP to encourage prophylaxis.   Eval:dispensed  Last PN MET - 8/19/21 - pt compliant updated this session.   Added adductor inhibition : 8/30/21  Current: Pt reviewed and feels comfortable with HEP and HEP has been successful with decreasing sx's during flare up : progressing 11/4/21     Long Term Goals: LTG- To be accomplished in 14 treatment(s):  1.  Pt will improve hip adduction drop to negative to indicate improved femoral-acetabular mobility needed for gait. .  Eval:positive bilaterally   Last PN: ; Right -; left - :MET 11/4/21        2.  Pt will improve hip adduction lift test to 4/5 bilaterally to indicate proper pelvic position needed for walking .  Eval:0/5 bilaterally  Last PN + PART at start of treatment _ at end . Retest ADD lift next visit if PART is _: progressing 10/5/21  Current: 2/5 adduction lift test bilaterally - 10/26/21      3.  Pt will improve SALUD squat to 3/5 or better to indicate LE, pelvic, hip and back mobility needed for daily activities  Eval:SALUD squat 1/5  Last PN :3/5*  Progressing 8/30/21     4.  Pt FOTO score will increase by 10 points to show improvement in function. 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  Also noted overall improvement with standing however flare ups seem to coincide with the start of her cycle.: progressing well 11/5/21  Current: Progressing 11/9/21 reported better able to tolerate classroom activities with trying to control stress    PLAN  []  Upgrade activities as tolerated     [x]  Continue plan of care  []  Update interventions per flow sheet       []  Discharge due to:_  []  Other:_      Burgess Laguna, PT, DPT 11/9/2021  5:08 PM    Future Appointments   Date Time Provider [place] Newberry   11/9/2021  5:15 PM Nic Abdullahi, PT, DPT [facility]   11/18/2021  5:15 PM Palomo Travis PTA [facility]   11/22/2021  9:20 AM DIANE Bateman   11/23/2021  4:30 PM Nic Abdullahi, PT, DPT [facility]   11/30/2021  5:15 PM Nic Abdullahi, PT, DPT [facility]   12/7/2021  5:15 PM Nic Abdullahi, PT, DPT [facility]   12/14/2021  5:15 PM Nic Abdullahi, PT, DPT [facility]   12/21/2021  7:00 AM DIANE Bateman   12/21/2021  5:15 PM Nic Abdullahi PT, DPT [facility]

## 2021-11-18 ENCOUNTER — HOSPITAL ENCOUNTER (OUTPATIENT)
Dept: PHYSICAL THERAPY | Age: 31
Discharge: HOME | End: 2021-11-18
Payer: COMMERCIAL

## 2021-11-18 PROCEDURE — 97112 NEUROMUSCULAR REEDUCATION: CPT

## 2021-11-18 PROCEDURE — 97530 THERAPEUTIC ACTIVITIES: CPT

## 2021-11-18 NOTE — PROGRESS NOTES
PT DAILY TREATMENT NOTE    Patient Name: Gloria Olmedo  Date:2021  : 1990  [x]  Patient  Verified  Payor: Alba Bravo / Plan: Dre Mcnair [id number] PPO / Product Type: PPO /    In time:5:25  Out time:6:35  Total Treatment Time (min): 70  Total Timed Codes (min): 70  1:1 Treatment Time (MC/BCBS only): 70   Visit #: 21 of 29    Treatment Dx: Other low back pain [M54.59]  SI (sacroiliac) joint dysfunction [M53.3]    SUBJECTIVE  Pain Level (0-10 scale): 3  Any medication changes, allergies to medications, adverse drug reactions, diagnosis change, or new procedure performed?: [x] No    [] Yes (see summary sheet for update)  Subjective functional status/changes:   [] No changes reported  \"Some things have felt better and some things have krept up.      OBJECTIVE              35 min Therapeutic Activity:  []  See flow sheet :   Rationale: increase ROM, increase strength, improve coordination, improve balance and increase proprioception  to improve the patients ability to perform pain free ADL\"S      35 min Neuromuscular Re-education:  []  See flow sheet :   Rationale: increase ROM, increase strength, improve coordination, improve balance and increase proprioception  to improve the patients ability to perform pain free ADL's      With   [] TE   [] TA   [] neuro   [] other: Patient Education: [x] Review HEP    [] Progressed/Changed HEP based on:   [] positioning   [] body mechanics   [] transfers   [] heat/ice application    [] other:      Other Objective/Functional Measures:   SALUD Special Tests (pre/post)  HGIR:                                                 Right: 90             Left: 90    Hip Add Drop Test:                           Right: -            Left:-     Hp ER                               Right: 30            Left: 30  Hp IR                                  Right : 40          Left: 40     Pain Level (0-10 scale) post treatment: 0    ASSESSMENT/Changes in Function:   Pt reported TTP along right anterior outlet that is constant. Reported Tenderness to low back. Pt stated MD has DC antibiotics and she is now on natural hormone supplements and trial seed cycling. Pt presented with more symmetrical Hip IR/ER . Focused this session on right anterior outlet inhibition. Pt reported wanting to start working out to tone and strengthen. Discussed and instructed in a small routine to included kettle bell front rack squat, split stance RDLS , hip trhust, planks and tricep dips. Patient will continue to benefit from skilled PT services to modify and progress therapeutic interventions, address functional mobility deficits, address ROM deficits, address strength deficits, analyze and address soft tissue restrictions, analyze and cue movement patterns, analyze and modify body mechanics/ergonomics, assess and modify postural abnormalities, address imbalance/dizziness and instruct in home and community integration to attain remaining goals. [x]  See Plan of Care  []  See progress note/recertification  []  See Discharge Summary         Progress towards goals / Updated goals:  Short Term Goals: STG- To be accomplished in 5 treatment(s):  1.  Pt will be independent with HEP to encourage prophylaxis. Eval:dispensed  Last PN MET - 8/19/21 - pt compliant updated this session.   Added adductor inhibition : 8/30/21  Current: Pt reviewed and feels comfortable with HEP and HEP has been successful with decreasing sx's during flare up. Added right glut crossover.  Reviewed a gentle strengthening workout with keetle garcía squats, hip thrusts , and RDL's  : progressing 11/18/21     Long Term Goals: LTG- To be accomplished in 14 treatment(s):  1.  Pt will improve hip adduction drop to negative to indicate improved femoral-acetabular mobility needed for gait. .  Eval:positive bilaterally   Last PN: ; Right -; left - :MET 11/4/21        2.  Pt will improve hip adduction lift test to 4/5 bilaterally to indicate proper pelvic position needed for walking .  Eval:0/5 bilaterally  Last PN + PART at start of treatment _ at end . Retest ADD lift next visit if PART is _: progressing 10/5/21  Current: 2/5 adduction lift test bilaterally - 10/26/21      3.  Pt will improve SALUD squat to 3/5 or better to indicate LE, pelvic, hip and back mobility needed for daily activities  Eval:SALUD squat 1/5  Last PN :3/5*  Progressing 8/30/21     4.  Pt FOTO score will increase by 10 points to show improvement in function. 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  Also noted overall improvement with standing however flare ups seem to coincide with the start of her cycle.: progressing well 11/5/21  Current: Progressing 11/9/21 reported better able to tolerate classroom activities with trying to control stress       PLAN  []  Upgrade activities as tolerated     [x]  Continue plan of care  []  Update interventions per flow sheet       []  Discharge due to:_  []  Other:_      Chryl Fabry, PTA 11/18/2021  1:32 PM    Future Appointments   Date Time Provider [place] Newberry   11/18/2021  5:15 PM Trudy May [facility]   11/22/2021  9:20 AM Clorinda Jose Cruz, PT [address] [facility]   11/23/2021  4:30 PM Howardzahra Rosee, PT, DPT [facility] THE [facility]   11/30/2021  5:15 PM Howard Breeze, PT, DPT [facility]   12/7/2021  5:15 PM Howard Breeze, PT, DPT [facility]   12/14/2021  5:15 PM Howard Breeze, PT, DPT [facility] THE [facility]   12/21/2021  7:00 AM Clorinda Mom, PT [address] [facility]   12/21/2021  5:15 PM Howard Breeze, PT, DPT [facility] THE [facility]

## 2021-11-23 ENCOUNTER — APPOINTMENT (OUTPATIENT)
Dept: PHYSICAL THERAPY | Age: 31
End: 2021-11-23
Payer: COMMERCIAL

## 2021-11-30 ENCOUNTER — HOSPITAL ENCOUNTER (OUTPATIENT)
Dept: PHYSICAL THERAPY | Age: 31
Discharge: HOME | End: 2021-11-30
Payer: COMMERCIAL

## 2021-11-30 PROCEDURE — 97112 NEUROMUSCULAR REEDUCATION: CPT

## 2021-11-30 PROCEDURE — 97530 THERAPEUTIC ACTIVITIES: CPT

## 2021-11-30 NOTE — PROGRESS NOTES
PT DAILY TREATMENT NOTE    Patient Name: Penelope Duran  Date:2021  : 1990  [x]  Patient  Verified  Payor: BLUE CROSS / Plan: Dre Mcnair [id number] PPO / Product Type: PPO /    In time:5:16 pm  Out time:6:12 pm   Total Treatment Time (min): 54  Total Timed Codes (min): 54  1:1 Treatment Time (MC/BCBS only): 54   Visit #: 22 of 29    Treatment Dx: Other low back pain [M54.59]  SI (sacroiliac) joint dysfunction [M53.3]    SUBJECTIVE  Pain Level (0-10 scale): 2  Any medication changes, allergies to medications, adverse drug reactions, diagnosis change, or new procedure performed?: [x] No    [] Yes (see summary sheet for update)  Subjective functional status/changes:   [] No changes reported  \"I flared but not as bad as I usually do. \"  Reported that after last session had left posterior hip capsule tenderness and tightness at right anterior hip \"Typical cross pattern pain\"  Saw pelvic floor PT prior to , traveled 10 hours in the core     OBJECTIVE    24 min Therapeutic Activity:  [x]  See flow sheet :   Rationale: increase ROM, increase strength, improve coordination and increase proprioception  to improve the patients ability to perform daily activities with decreased pain and symptom levels       30 min Neuromuscular Re-education:  [x]  See flow sheet :   Rationale: increase ROM, increase strength, improve coordination and increase proprioception  to improve the patients ability to perform daily activities with decreased pain and symptom levels            With   [] TE   [] TA   [] neuro   [] other: Patient Education: [x] Review HEP    [] Progressed/Changed HEP based on:   [] positioning   [] body mechanics   [] transfers   [] heat/ice application    [] other:      Other Objective/Functional Measures:   SALUD Special Tests (pre/post)  HGIR:                                                 Right: 80/90             Left: 90/90  Hip Add Drop Test:                           Right: -/- Left:midline/-  Passive Abduction raise:  Right: -   Left: -  Trunk Rot                                           Right: 14.5 in/13 in Left 14.5 in  /13 in  Shoulder Horizontal Abduction          Right: 40 /NT             Left: 32 /NT       Pain Level (0-10 scale) post treatment: 1-2    ASSESSMENT/Changes in Function:   Pt reported having a flare up Saturday after last appointment and trial of strengthening HEP Tried from previous PT and sessions along with subramanian what sounds like self release to right iliacus and left posterior hip, followed by MET correction and SALUD repositioning exercises with good tolerance. Pt was able to self manage pain and sx and decrease pain. Followed this session with right anterior outlet inhibition and left posterior outlet inhibition with good tolerance. Increased right glut fatigue post session. Patient will continue to benefit from skilled PT services to modify and progress therapeutic interventions, address functional mobility deficits, address ROM deficits, address strength deficits, analyze and address soft tissue restrictions, analyze and cue movement patterns, analyze and modify body mechanics/ergonomics, assess and modify postural abnormalities and instruct in home and community integration to attain remaining goals. [x]  See Plan of Care  []  See progress note/recertification  []  See Discharge Summary         Progress towards goals / Updated goals:  Short Term Goals: STG- To be accomplished in 5 treatment(s):  1.  Pt will be independent with HEP to encourage prophylaxis. Eval:dispensed  Last PN MET - 8/19/21 - pt compliant updated this session.   Added adductor inhibition : 8/30/21  Current: Pt reviewed and feels comfortable with HEP and HEP has been successful with decreasing sx's during flare up. Added right glut crossover.  Reviewed a gentle strengthening workout with keetle garcía squats, hip thrusts , and RDL's  : progressing 11/18/21     Long Term Goals: LTG- To be accomplished in 14 treatment(s):  1.  Pt will improve hip adduction drop to negative to indicate improved femoral-acetabular mobility needed for gait. .  Eval:positive bilaterally   Last PN: ; Right -; left - :MET 11/4/21        2.  Pt will improve hip adduction lift test to 4/5 bilaterally to indicate proper pelvic position needed for walking .  Eval:0/5 bilaterally  Last PN + PART at start of treatment _ at end . Retest ADD lift next visit if PART is _: progressing 10/5/21  Current: 2/5 adduction lift test bilaterally - 10/26/21      3.  Pt will improve SALUD squat to 3/5 or better to indicate LE, pelvic, hip and back mobility needed for daily activities  Eval:SALUD squat 1/5  Last PN :3/5*  Progressing 8/30/21  Current: progressing 11/30/21 good tolerance with split stance RDL and squat to table.      4.  Pt FOTO score will increase by 10 points to show improvement in function. 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  Also noted overall improvement with standing however flare ups seem to coincide with the start of her cycle.: progressing well 11/5/21  Current: Progressing 11/9/21 reported better able to tolerate classroom activities with trying to control stress       PLAN  []  Upgrade activities as tolerated     [x]  Continue plan of care  []  Update interventions per flow sheet       []  Discharge due to:_  []  Other:_      Raúl Yuen, PT, DPT 11/30/2021  5:24 PM    Future Appointments   Date Time Provider eKvin Newberry   12/7/2021  5:15 PM Kyler Womack PT, DPT [facility]   12/14/2021  5:15 PM Kyler Womack PT, DPT [facility]   12/21/2021  7:00 AM Migdalia Dunham, PT [address] [facility]   12/21/2021  5:15 PM Kyler Womack PT, DPT [facility]

## 2021-12-07 ENCOUNTER — HOSPITAL ENCOUNTER (OUTPATIENT)
Dept: PHYSICAL THERAPY | Age: 31
Discharge: HOME | End: 2021-12-07
Payer: COMMERCIAL

## 2021-12-07 PROCEDURE — 97112 NEUROMUSCULAR REEDUCATION: CPT

## 2021-12-07 PROCEDURE — 97530 THERAPEUTIC ACTIVITIES: CPT

## 2021-12-07 NOTE — PROGRESS NOTES
PT DAILY TREATMENT NOTE    Patient Name: Lorraine Campos  Date:2021  : 1990  [x]  Patient  Verified  Payor: BLUE CROSS / Plan: [facility] PPO / Product Type: PPO /    In time:5:19 pm  Out time:6:10 pm  Total Treatment Time (min): 51  Total Timed Codes (min): 51  1:1 Treatment Time (MC/BCBS only): 51   Visit #: 23 of 29    Treatment Dx: Other low back pain [M54.59]  SI (sacroiliac) joint dysfunction [M53.3]    SUBJECTIVE  Pain Level (0-10 scale): 2  Any medication changes, allergies to medications, adverse drug reactions, diagnosis change, or new procedure performed?: [x] No    [] Yes (see summary sheet for update)  Subjective functional status/changes:   [] No changes reported  \"Tired. Sore but not flared so that is good. \"    OBJECTIVE    14 min Therapeutic Activity:  [x]  See flow sheet :   Rationale: increase ROM, increase strength, improve coordination and increase proprioception  to improve the patients ability to perform daily activities with decreased pain and symptom levels       37 min Neuromuscular Re-education:  [x]  See flow sheet :   Rationale: increase ROM, increase strength, improve coordination and increase proprioception  to improve the patients ability to perform daily activities with decreased pain and symptom levels            With   [] TE   [] TA   [] neuro   [] other: Patient Education: [x] Review HEP    [] Progressed/Changed HEP based on:   [] positioning   [] body mechanics   [] transfers   [] heat/ice application    [] other:      Other Objective/Functional Measures:   SALUD Special Tests (pre/post)  HGIR:                                                 Right: 74/90             Left: 75/90  Hip Add Drop Test:                           Right: +/-            Left:+/-  Passive Abduction Raise   Right: +  Left: +  Trunk Rot                                            Right: 15 in/14 in  Left 15 in /14 in  Shoulder Horizontal Abduction           Right: 40 Left: 30 /NT  Hip adduction lift   Right: 2/5  Left: 2/5        Pain Level (0-10 scale) post treatment: 0-1    ASSESSMENT/Changes in Function:   Pt responded very well to respiratory crawling. Reports that is continuing to do illiacus release and MET but overall is feeling sore and not flared up with gentle strengthening exercises. Reported increased fatigue this session due to starting menstrual cycle. Discussed adding crawl in as warm-up to strengthening exercises. Patient will continue to benefit from skilled PT services to modify and progress therapeutic interventions, address functional mobility deficits, address ROM deficits, address strength deficits, analyze and address soft tissue restrictions, analyze and cue movement patterns, analyze and modify body mechanics/ergonomics, assess and modify postural abnormalities and instruct in home and community integration to attain remaining goals. [x]  See Plan of Care  []  See progress note/recertification  []  See Discharge Summary         Progress towards goals / Updated goals:  Short Term Goals: STG- To be accomplished in 5 treatment(s):  1.  Pt will be independent with HEP to encourage prophylaxis. Eval:dispensed  Last PN MET - 8/19/21 - pt compliant updated this session.   Added adductor inhibition : 8/30/21  Current: Pt reviewed and feels comfortable with HEP and HEP has been successful with decreasing sx's during flare up. Added right glut crossover.  Reviewed a gentle strengthening workout with keetle garcía squats, hip thrusts , and RDL's  : progressing 11/18/21     Long Term Goals: LTG- To be accomplished in 14 treatment(s):  1.  Pt will improve hip adduction drop to negative to indicate improved femoral-acetabular mobility needed for gait. .  Eval:positive bilaterally   Last PN: ; Right -; left - :MET 11/4/21        2.  Pt will improve hip adduction lift test to 4/5 bilaterally to indicate proper pelvic position needed for walking .  Eval:0/5 bilaterally  Last PN + PART at start of treatment _ at end . Retest ADD lift next visit if PART is _: progressing 10/5/21  Current: 2/5 adduction lift test bilaterally - 12/7/21      3.  Pt will improve SALUD squat to 3/5 or better to indicate LE, pelvic, hip and back mobility needed for daily activities  Eval:SALUD squat 1/5  Last PN :3/5*  Progressing 8/30/21  Current: progressing 11/30/21 good tolerance with split stance RDL and squat to table.      4.  Pt FOTO score will increase by 10 points to show improvement in function. 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  Also noted overall improvement with standing however flare ups seem to coincide with the start of her cycle.: progressing well 11/5/21  Current: Progressing 12/7/21 reported better able to tolerate classroom activities with trying to control stress    PLAN  []  Upgrade activities as tolerated     [x]  Continue plan of care  []  Update interventions per flow sheet       []  Discharge due to:_  []  Other:_      Wero Mckeon, PT, DPT 12/7/2021  5:22 PM    Future Appointments   Date Time Provider Kevin Newberry   12/14/2021  5:15 PM Mara Marin PT, DPT [facility]   12/21/2021  7:00 AM DIANE Bowles   12/21/2021  5:15 PM Mara Marin PT, DPT [facility]

## 2021-12-08 NOTE — PROGRESS NOTES
[facility] at the [address], [place], [address] [facility]  Phone: [phone]      Fax:  [phone]    Progress Note  Patient name: Taylor Villatoro Start of Care: 2021   Referral source: Jesica Anthony MD : 1990               Medical Diagnosis: Low back pain [M54.5]  Sacroiliac joint pain [M53.3]    Onset Date:3 years ago2               Treatment Diagnosis: Lumbopelvic dysfunction    Prior Hospitalization: see medical history Provider#: [id number]   Medications: Verified on Patient summary List    Comorbidities: hx of chronic bladder infection   Prior Level of Function: Increase in pain 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, 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  Pt will be independent with HEP to encourage prophylaxis. Eval:dispensed  Last PN MET - 21 - pt compliant updated this session.   Added adductor inhibition : 21  Current: Pt reviewed and feels comfortable with HEP and HEP has been successful with decreasing sx's during flare up. Added right glut crossover. Reviewed a gentle strengthening workout with keetle garcía squats, hip thrusts , and RDL's  : progressing 21     Long Term Goals: LTG- To be accomplished in 14 treatment(s):  1.  Pt will improve hip adduction drop to negative to indicate improved femoral-acetabular mobility needed for gait. .  Eval:positive bilaterally   Last PN: ; Right -; left - :MET 21        2.  Pt will improve hip adduction lift test to 4/5 bilaterally to indicate proper pelvic position needed for walking .  Eval:0/5 bilaterally  Last PN + PART at start of treatment _ at end .  Retest ADD lift next visit if PART is _: progressing 10/5/21  Current: 2/5 adduction lift test bilaterally - 12/7/21      3.  Pt will improve SALUD squat to 3/5 or better to indicate LE, pelvic, hip and back mobility needed for daily activities  Eval:SALUD squat 1/5  Last PN :3/5*  Progressing 8/30/21  Current: progressing 11/30/21 good tolerance with split stance RDL and squat to table.      4.  Pt FOTO score will increase by 10 points to show improvement in function. 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 Also noted overall improvement with standing however flare ups seem to coincide with the start of her cycle.: progressing well 11/5/21  Current: Progressing 12/7/21 reported better able to tolerate classroom activities with trying to control stress    Key Functional Changes:   Pt has been seen in therapy 23x including initial eval. Pt has 3 year hx of SIJ and LBP coinciding with chronic low grade bladder infection that onset ~3 years ago. Pt is currently being seen 1 visit per month for pelvic floor PT with dry needling. At present current signs/symptoms consistent with lumbo-pelvic dysfunction and SIJ dysfunction, with pain into lower abdomen , bilateral adductors and right SI.  Pt has reported significant  improvement in sx's since starting therapy and  reported increased ability to tolerate daily activities and overall reduction in pain level. Pt has also reported ability to self manage pain and sx's during a \"flare up\" with performing HEP. Pt has reported increased frequency of pain free days, Has goal of returning to exercise. Have tried to gradually introduce strengthening exercises with intermittent set backs. Updated Goals: to be achieved in 7 treatments:  1.  Pt will be able to return to or start an exercise program without reoccurrence of pain to allow to return to healthy lifestyle.      ASSESSMENT/RECOMMENDATIONS:  [x]Continue therapy per initial plan/protocol at a frequency of  7 treatments  []Continue therapy with the following recommended changes:_____________________      _____________________________________________________________________  []Discontinue therapy progressing towards or have reached established goals  []Discontinue therapy due to lack of appreciable progress towards goals  []Discontinue therapy due to lack of attendance or compliance  []Await Physician's recommendations/decisions regarding therapy  []Other:________________________________________________________________    Thank you for this referral.   Gennie Ganser, PT, DPT 12/7/2021 7:23 PM

## 2021-12-14 ENCOUNTER — HOSPITAL ENCOUNTER (OUTPATIENT)
Dept: PHYSICAL THERAPY | Age: 31
Discharge: HOME | End: 2021-12-14
Payer: COMMERCIAL

## 2021-12-14 PROCEDURE — 97530 THERAPEUTIC ACTIVITIES: CPT

## 2021-12-14 PROCEDURE — 97112 NEUROMUSCULAR REEDUCATION: CPT

## 2021-12-14 NOTE — PROGRESS NOTES
PT DAILY TREATMENT NOTE    Patient Name: Shannan Stuart  Date:2021  : 1990  [x]  Patient  Verified  Payor: BLUE ALEJA / Plan: Dre Mcnair [id number] PPO / Product Type: PPO /    In time:5:27 pm  Out time:6:17 pm   Total Treatment Time (min): 50  Total Timed Codes (min): 50  1:1 Treatment Time (MC/BCBS only): 50   Visit #: 24 of 29    Treatment Dx: Other low back pain [M54.59]  SI (sacroiliac) joint dysfunction [M53.3]    SUBJECTIVE  Pain Level (0-10 scale): 4  Any medication changes, allergies to medications, adverse drug reactions, diagnosis change, or new procedure performed?: [x] No    [] Yes (see summary sheet for update)  Subjective functional status/changes:   [] No changes reported  \"I am frustrated. I had a flare up. It seems to happen when I start to exercise. \"    OBJECTIVE    15 min Therapeutic Activity:  [x]  See flow sheet :   Rationale: increase ROM, increase strength, improve coordination, improve balance and increase proprioception  to improve the patients ability to perform daily activities with decreased pain and symptom levels       35 min Neuromuscular Re-education:  [x]  See flow sheet :cupping to lumbar and thoracic paraspinals in child's pose    Rationale: increase ROM, increase strength, improve coordination, improve balance and increase proprioception  to improve the patients ability to perform daily activities with decreased pain and symptom levels          With   [] TE   [] TA   [] neuro   [] other: Patient Education: [x] Review HEP    [] Progressed/Changed HEP based on:   [] positioning   [] body mechanics   [] transfers   [] heat/ice application    [] other:      Other Objective/Functional Measures:   SAULD Special Tests (pre/post)  HGIR:                                                 Right: 74/90             Left: 85/90  Hip Add Drop Test:                           Right: midline/-         Left:+/-  Passive Abduction Raise Test: Right: +  Left: +  Trunk Rot Right: 16 in/15 in Left 15 in /14 in  Shoulder Horizontal Abduction           Right: 45 /NT             Left: 35 /NT     Hip IR     Right: 24  Left: 34  Hip ER     Right: 24/30  Left: 20/26    TTP bilateral adductors     Pain Level (0-10 scale) post treatment: 1-2    ASSESSMENT/Changes in Function:   Pt reported feeling flare up after trying RDLs last Thursday. Was able to calm down pain with interventions this session - focussed on hamstrings, left obliques and right glut max. Updated HEP with paraspinal release and table top kicks for gluts and hamstrings. Patient will continue to benefit from skilled PT services to modify and progress therapeutic interventions, address functional mobility deficits, address ROM deficits, address strength deficits, analyze and address soft tissue restrictions, analyze and cue movement patterns, analyze and modify body mechanics/ergonomics, assess and modify postural abnormalities and instruct in home and community integration to attain remaining goals. [x]  See Plan of Care  []  See progress note/recertification  []  See Discharge Summary         Progress towards goals / Updated goals:  Short Term Goals: STG- To be accomplished in 5 treatment(s):  1.  Pt will be independent with HEP to encourage prophylaxis. Eval:dispensed  Last PN MET - 8/19/21 - pt compliant updated this session.   Added adductor inhibition : 8/30/21  Current: Pt reviewed and feels comfortable with HEP and HEP has been successful with decreasing sx's during flare up. Added right glut crossover.  Reviewed a gentle strengthening workout with keetle garcía squats, hip thrusts , and RDL's  : progressing 11/18/21     Long Term Goals: LTG- To be accomplished in 14 treatment(s):  1.  Pt will improve hip adduction drop to negative to indicate improved femoral-acetabular mobility needed for gait. .  Eval:positive bilaterally   Last PN: ; Right -; left - :MET 11/4/21        2.  Pt will improve hip adduction lift test to 4/5 bilaterally to indicate proper pelvic position needed for walking .  Eval:0/5 bilaterally  Last PN + PART at start of treatment _ at end . Retest ADD lift next visit if PART is _: progressing 10/5/21  Current: 2/5 adduction lift test bilaterally - 12/7/21      3.  Pt will improve SALUD squat to 3/5 or better to indicate LE, pelvic, hip and back mobility needed for daily activities  Eval:SALUD squat 1/5  Last PN :3/5*  Progressing 8/30/21  Current: progressing 11/30/21 good tolerance with split stance RDL and squat to table.      4.  Pt FOTO score will increase by 10 points to show improvement in function. 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 Also noted overall improvement with standing however flare ups seem to coincide with the start of her cycle.: progressing well 11/5/21  Current: Progressing 12/7/21 reported better able to tolerate classroom activities with trying to control stress    1. Pt will be able to return to or start an exercise program without reoccurrence of pain to allow to return to healthy lifestyle.    Current: Progressing 12/14/21 updated this session     PLAN  [x]  Upgrade activities as tolerated     [x]  Continue plan of care  []  Update interventions per flow sheet       []  Discharge due to:_  []  Other:_      Sharri David, PT, DPT 12/14/2021  5:26 PM    Future Appointments   Date Time Provider Kevin Newberry   12/21/2021  7:00 AM Laura Roy, PT [address] [facility]   12/21/2021  5:15 PM Jai Gil PT, DPT [facility]   1/4/2022  5:15 PM Jai Gil PT, DPT [facility]   1/11/2022  5:15 PM Jai Gil PT, DPT [facility]   1/18/2022  5:15 PM Jai Gil PT, DPT [facility]   1/25/2022  5:15 PM Kathryn Weber[facility] PT[facility]

## 2021-12-21 ENCOUNTER — HOSPITAL ENCOUNTER (OUTPATIENT)
Dept: PHYSICAL THERAPY | Age: 31
Discharge: HOME | End: 2021-12-21
Payer: COMMERCIAL

## 2021-12-21 PROCEDURE — 97530 THERAPEUTIC ACTIVITIES: CPT

## 2021-12-21 PROCEDURE — 97140 MANUAL THERAPY 1/> REGIONS: CPT

## 2021-12-21 PROCEDURE — 97112 NEUROMUSCULAR REEDUCATION: CPT

## 2021-12-21 NOTE — PROGRESS NOTES
PT DAILY TREATMENT NOTE    Patient Name: Get Mazagm  Date:2021  : 1990  [x]  Patient  Verified  Payor: Snow Junior / Plan: Dre Mcnair [id number] PPO / Product Type: PPO /    In time:3:50  Out time:4;45  Total Treatment Time (min): 55  Total Timed Codes (min): 55  1:1 Treatment Time (MC/BCBS only): 55   Visit #: 25 of 29    Treatment Dx: Other low back pain [M54.59]  SI (sacroiliac) joint dysfunction [M53.3]    SUBJECTIVE  Pain Level (0-10 scale): 3  Any medication changes, allergies to medications, adverse drug reactions, diagnosis change, or new procedure performed?: [x] No    [] Yes (see summary sheet for update)  Subjective functional status/changes:   [] No changes reported  \"not terrible. I was able to walk around the tour. Standing for 3 hours. My hips are tight. \"     OBJECTIVE        15 min Therapeutic Activity:  []  See flow sheet :   Rationale: increase ROM, increase strength, improve coordination, improve balance and increase proprioception  to improve the patients ability to perform pain free ADL\"S      30 min Neuromuscular Re-education:  []  See flow sheet :   Rationale: increase ROM, increase strength, improve coordination, improve balance and increase proprioception  to improve the patients ability to perform pain free ADL's     10 min Manual Therapy: Left  Posterior hip capsule stretching   Cupping to bilateral paraspinal in right side lying    Rationale: decrease pain, increase ROM and increase tissue extensibility to improve the patients ability to perform   The manual therapy interventions were performed at a separate and distinct time from the therapeutic activities interventions.          With   [] TE   [] TA   [] neuro   [] other: Patient Education: [x] Review HEP    [] Progressed/Changed HEP based on:   [] positioning   [] body mechanics   [] transfers   [] heat/ice application    [] other:      Other Objective/Functional Measures:   SALUD Special Tests (pre/post)  HGIR:                          Right: 65             Left: 70  SLR   Right: 80             Left: 90  Hip Add Drop Test:       Right: past mid /-            Left:past mid/-    Hip ER                           Right: 40            Left: 40  Hip IR                              Right : 25          Left: 25    Pain Level (0-10 scale) post treatment: 0-1    ASSESSMENT/Changes in Function:   Pt presented in therapy with mild pain . Stated left LBP at 3/10 in left  Posterior hip capsule. Pt reported she was able to travel in a care for >5 hours and walking 3 hours with some mild increase in sx's includining \"tighening\" in lateral hips and anterior pelvis, however sx's were tolerable and managed with HEP. Pt was very pleased with this progress. Pt is going to start walking on an incline for exercise and will add in 1 new glut exercise per workout session. Performed tricep bridges today with no pain. Assess if pt had flare up at next session. Plan to add planks after pt is clear with glut exercises and no flare ups. Patient will continue to benefit from skilled PT services to modify and progress therapeutic interventions, address functional mobility deficits, address ROM deficits, address strength deficits, analyze and address soft tissue restrictions, analyze and cue movement patterns, analyze and modify body mechanics/ergonomics, assess and modify postural abnormalities, address imbalance/dizziness and instruct in home and community integration to attain remaining goals. [x]  See Plan of Care  []  See progress note/recertification  []  See Discharge Summary         Progress towards goals / Updated goals:    Short Term Goals: STG- To be accomplished in 5 treatment(s):  1.  Pt will be independent with HEP to encourage prophylaxis.   Eval:dispensed  Last PN MET - 8/19/21 - pt compliant updated this session.   Added adductor inhibition : 8/30/21  Current: Pt reviewed and feels comfortable with HEP and HEP has been successful with decreasing sx's during flare up. Added right glut crossover. Reviewed a gentle strengthening workout with keetle garcía squats, hip thrusts , and RDL's  : progressing 11/18/21  Current; Pt is independent with HEP. Added and reviewed tricep bridges : progressing 12/21/21     Long Term Goals: LTG- To be accomplished in 14 treatment(s):  1.  Pt will improve hip adduction drop to negative to indicate improved femoral-acetabular mobility needed for gait. .  Eval:positive bilaterally   Last PN: ; Right -; left - :MET 11/4/21        2.  Pt will improve hip adduction lift test to 4/5 bilaterally to indicate proper pelvic position needed for walking .  Eval:0/5 bilaterally  Last PN + PART at start of treatment _ at end . Retest ADD lift next visit if PART is _: progressing 10/5/21  Current: 2/5 adduction lift test bilaterally - 12/7/21      3.  Pt will improve SALUD squat to 3/5 or better to indicate LE, pelvic, hip and back mobility needed for daily activities  Eval:SALUD squat 1/5  Last PN :3/5*  Progressing 8/30/21  Current: progressing 11/30/21 good tolerance with split stance RDL and squat to table.      4.  Pt FOTO score will increase by 10 points to show improvement in function. 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 Also noted overall improvement with standing however flare ups seem to coincide with the start of her cycle.: progressing well 11/5/21  Current: : pt was able to stand and walk for 3 hours on a tour however reported some tightness in bilateral lateral hips. : progressing 12/21/21     1.  Pt will be able to return to or start an exercise program without reoccurrence of pain to allow to return to healthy lifestyle.   Current: Progressing 12/14/21 updated this session   PLAN  []  Upgrade activities as tolerated     [x]  Continue plan of care  []  Update interventions per flow sheet       []  Discharge due to:_  []  Other:_      Tomasz Nicolas, PRESLEY 12/21/2021  2:58 PM    Future Appointments   Date Time Provider Kevin Newberry   12/21/2021  3:45 PM [place] Crane [facility]   1/4/2022  5:15 PM Herzog Roni, PT, DPT [facility]   1/11/2022  5:15 PM Herzog Roni, PT, DPT [facility]   1/18/2022  5:15 PM Herzog Roni, PT, DPT [facility]   1/25/2022  5:15 PM Herzog Roni, PT, DPT [facility]

## 2021-12-27 ENCOUNTER — HOSPITAL ENCOUNTER (OUTPATIENT)
Dept: PHYSICAL THERAPY | Age: 31
Discharge: HOME | End: 2021-12-27
Payer: COMMERCIAL

## 2021-12-27 PROCEDURE — 97530 THERAPEUTIC ACTIVITIES: CPT

## 2021-12-27 PROCEDURE — 97112 NEUROMUSCULAR REEDUCATION: CPT

## 2021-12-27 PROCEDURE — 97140 MANUAL THERAPY 1/> REGIONS: CPT

## 2021-12-27 NOTE — PROGRESS NOTES
PT DAILY TREATMENT NOTE    Patient Name: Corby November  Date:2021  : 1990  [x]  Patient  Verified  Payor: BLUE ALEJA / Plan: Dre Mcnair [id number] PPO / Product Type: PPO /    In time:3:08  Out time:4:10  Total Treatment Time (min): 62  Total Timed Codes (min): 62  1:1 Treatment Time (MC/BCBS only): 62   Visit #: 26 of 29    Treatment Dx: Other low back pain [M54.59]  SI (sacroiliac) joint dysfunction [M53.3]    SUBJECTIVE  Pain Level (0-10 scale): 3  Any medication changes, allergies to medications, adverse drug reactions, diagnosis change, or new procedure performed?: [x] No    [] Yes (see summary sheet for update)  Subjective functional status/changes:   [] No changes reported  \" I woke up the next day and I had no pain! I felt the best I have felt in 3 years. I was able to stand for hours shopping ans sit for hours to watch a movie and I was fine. Until we lifted something heavy. And then I woke up in pain. \"     OBJECTIVE          15 min Therapeutic Activity:  []  See flow sheet :   Rationale: increase ROM, increase strength, improve coordination, improve balance and increase proprioception  to improve the patients ability to perform pain free ADL's      32 min Neuromuscular Re-education:  []  See flow sheet :   Rationale: increase ROM, increase strength, improve coordination, improve balance and increase proprioception  to improve the patients ability to perform pain free ADL's     15 min Manual Therapy:  Cupping along lumbar paraspinals in bobby pose with trigger point release and STM to left posterior hip. Manual stretch into left posterior hip.  STM to left glut in right side lying   Manual STM to bilateral adductors in hooklying adductor inhibition    Rationale: decrease pain, increase ROM, increase tissue extensibility, decrease edema , correct positional vertigo, decrease trigger points and increase postural awareness to improve the patients ability to perform pain free ADL's The manual therapy interventions were performed at a separate and distinct time from the therapeutic activities interventions. With   [] TE   [x] TA   [] neuro   [] other: Patient Education: [x] Review HEP    [] Progressed/Changed HEP based on:   [] positioning   [] body mechanics   [] transfers   [] heat/ice application    [] other:      Other Objective/Functional Measures:   SALUD Special Tests (pre/post)  HGIR:                          Right: 90             Left: 90  SLR   Right: 70/90             Left: 80/90  Hip Add Drop Test:      Right: past mid/-      Left:past mid/-      Hip IR                               Right: 37            Left: 34  Hip ER                               Right : 32          Left: 33  Squat 3/5      Pain Level (0-10 scale) post treatment: 0-1    ASSESSMENT/Changes in Function:   Pt presented in therapy , reported feeling the beast she has ever  after last session. Stated she was able to stand and shop as well as sit to watch a movie for several hours without pain. Pt was symptom free for approximately 24 hours before she helped to lift a couch and her symptoms returned the next day. Pt was able to perform HEP and have reduced sx's however not eliminated. Pt is progressing well. Tolerated tricep bridges well last visit, so this visit added in paraspinal release starting and ending with left hamstring. Discussed with pt to slowing add in incline walking at tolerance. Plan to add in table tops next session if no flare up occurs. Also plan to progress to plank variations, and squat.      Patient will continue to benefit from skilled PT services to modify and progress therapeutic interventions, address functional mobility deficits, address ROM deficits, address strength deficits, analyze and address soft tissue restrictions, analyze and cue movement patterns, analyze and modify body mechanics/ergonomics, assess and modify postural abnormalities, address imbalance/dizziness and instruct in home and community integration to attain remaining goals. [x]  See Plan of Care  []  See progress note/recertification  []  See Discharge Summary         Progress towards goals / Updated goals:   Short Term Goals: STG- To be accomplished in 5 treatment(s):  1.  Pt will be independent with HEP to encourage prophylaxis. Eval:dispensed  Last PN MET - 8/19/21 - pt compliant updated this session.   Added adductor inhibition : 8/30/21  Current: Pt reviewed and feels comfortable with HEP and HEP has been successful with decreasing sx's during flare up. Added right glut crossover. Reviewed a gentle strengthening workout with keetle garcía squats, hip thrusts , and RDL's  : progressing 11/18/21  Current; Pt is independent with HEP. Added and reviewed tricep bridges : progressing 12/21/21     Long Term Goals: LTG- To be accomplished in 14 treatment(s):  1.  Pt will improve hip adduction drop to negative to indicate improved femoral-acetabular mobility needed for gait. .  Eval:positive bilaterally   Last PN: ; Right -; left - :MET 11/4/21        2.  Pt will improve hip adduction lift test to 4/5 bilaterally to indicate proper pelvic position needed for walking .  Eval:0/5 bilaterally  Last PN + PART at start of treatment _ at end . Retest ADD lift next visit if PART is _: progressing 10/5/21  Current: 2/5 adduction lift test bilaterally - 12/7/21      3.  Pt will improve SALUD squat to 3/5 or better to indicate LE, pelvic, hip and back mobility needed for daily activities  Eval:SALUD squat 1/5  Last PN :3/5*  Progressing 8/30/21  Current: Pt was able to squat to parallel with good rib cage. Possible 3/5 with some reports of lateral hip tension: progressing 12/27/21     4.  Pt FOTO score will increase by 10 points to show improvement in function. 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   Eval: tolerance at 45 min  Last PN: Pt reported flare up after standing at work all day however sx's decreased after HEP. Also noted overall improvement with standing however flare ups seem to coincide with the start of her cycle.: progressing well 11/5/21  Current: : pt was able to stand and walk for 3 hours on a tour however reported some tightness in bilateral lateral hips. : progressing 12/21/21     1.  Pt will be able to return to or start an exercise program without reoccurrence of pain to allow to return to healthy lifestyle.   Current: Progressing 12/14/21 updated this session   Updated with paraspinal release : progressing 12/27/21    PLAN  []  Upgrade activities as tolerated     [x]  Continue plan of care  []  Update interventions per flow sheet       []  Discharge due to:_  []  Other:_      Jesús PRESLEY Doshi 12/27/2021  1:03 PM    Future Appointments   Date Time Provider Kevin Newberry   12/27/2021  3:00 PM Silvia Phillips [facility]   1/4/2022  5:15 PM Zena Morrell, PT, DPT [facility]   1/11/2022  5:15 PM Zena Morrell PT, DPT [facility]   1/18/2022  5:15 PM Zena Morrell PT, DPT [facility]   1/25/2022  5:15 PM Zena Morrell PT, DPT [facility]

## 2022-01-04 ENCOUNTER — HOSPITAL ENCOUNTER (OUTPATIENT)
Dept: PHYSICAL THERAPY | Age: 32
Discharge: HOME | End: 2022-01-04
Payer: COMMERCIAL

## 2022-01-04 PROCEDURE — 97140 MANUAL THERAPY 1/> REGIONS: CPT

## 2022-01-04 PROCEDURE — 97530 THERAPEUTIC ACTIVITIES: CPT

## 2022-01-04 PROCEDURE — 97112 NEUROMUSCULAR REEDUCATION: CPT

## 2022-01-04 NOTE — PROGRESS NOTES
PT DAILY TREATMENT NOTE    Patient Name: Thuan Florian  Date:2022  : 1990  [x]  Patient  Verified  Payor: Trent Mejia / Plan: Dre Mcnair [id number] PPO / Product Type: PPO /    In time:5:18 pm  Out time:6:15 pm  Total Treatment Time (min): 57  Total Timed Codes (min): 57  1:1 Treatment Time (MC/BCBS only): 57   Visit #: 27 of 29    Treatment Dx: Other low back pain [M54.59]  SI (sacroiliac) joint dysfunction [M53.3]    SUBJECTIVE  Pain Level (0-10 scale): 3  Any medication changes, allergies to medications, adverse drug reactions, diagnosis change, or new procedure performed?: [x] No    [] Yes (see summary sheet for update)  Subjective functional status/changes:   [] No changes reported  \"I think I have some good things to report. \"  Pt reported that tried new strengthening exercises and reported only achiness.  Pt also started walking program on TM that goes to incline and decline - had muscle soreness  Indicated anterior pelvis and inner thighs after table top taps and outer hips with walking     OBJECTIVE    13 min Therapeutic Activity:  [x]  See flow sheet :   Rationale: increase ROM, increase strength, improve coordination and increase proprioception  to improve the patients ability to perform daily activities with decreased pain and symptom levels       30 min Neuromuscular Re-education:  [x]  See flow sheet :   Rationale: increase ROM, increase strength, improve coordination and increase proprioception  to improve the patients ability to perform daily activities with decreased pain and symptom levels      15 min Manual Therapy:  Instrument augmented STM to right adductor  Vacuum cupping to thoracic and lumbar paraspinals in child's pose with cushion under ischial tuberosities    Rationale: decrease pain, increase ROM, increase tissue extensibility, decrease trigger points and increase postural awareness to improve the patients ability to perform daily activities with decreased pain and symptom levels    The manual therapy interventions were performed at a separate and distinct time from the therapeutic activities interventions. With   [] TE   [] TA   [] neuro   [] other: Patient Education: [x] Review HEP    [] Progressed/Changed HEP based on:   [] positioning   [] body mechanics   [] transfers   [] heat/ice application    [] other:      Other Objective/Functional Measures:   SALUD Special Tests (pre/post)  HGIR:                                                 Right: 90             Left: 90  Hip Add Drop Test:                           Right: -              Left:midline/-  Passive Abduction Raise Test Right:+   Left: -  Trunk Rot                                            Right: 15 in/14 in Left 15 in /14 in   Shoulder Horizontal Abduction           Right: [facility]              Left: 30 /40       Pain Level (0-10 scale) post treatment: 1    ASSESSMENT/Changes in Function:   Pt was able to start walking o exercise and tolerate some mild strengthening exercises with no \"flare up\" just muscle soreness. 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 Updated strengthening program with bench planks and reviewed with pt. Patient will continue to benefit from skilled PT services to modify and progress therapeutic interventions, address functional mobility deficits, address ROM deficits, address strength deficits, analyze and address soft tissue restrictions, analyze and cue movement patterns, analyze and modify body mechanics/ergonomics, assess and modify postural abnormalities and instruct in home and community integration to attain remaining goals. [x]  See Plan of Care  []  See progress note/recertification  []  See Discharge Summary         Progress towards goals / Updated goals:  Short Term Goals: STG- To be accomplished in 5 treatment(s):  1.  Pt will be independent with HEP to encourage prophylaxis.   Eval:dispensed  Last PN MET - 8/19/21 - pt compliant updated this session.   Added adductor inhibition : 8/30/21  Current: Pt reviewed and feels comfortable with HEP and HEP has been successful with decreasing sx's during flare up. Added right glut crossover. Reviewed a gentle strengthening workout with keetle garcía squats, hip thrusts , and RDL's  : progressing 11/18/21  Current; Pt is independent with HEP. Added and reviewed tricep bridges : progressing 12/21/21     Long Term Goals: LTG- To be accomplished in 14 treatment(s):  1.  Pt will improve hip adduction drop to negative to indicate improved femoral-acetabular mobility needed for gait. .  Eval:positive bilaterally   Last PN: ; Right -; left - :MET 11/4/21        2.  Pt will improve hip adduction lift test to 4/5 bilaterally to indicate proper pelvic position needed for walking .  Eval:0/5 bilaterally  Last PN + PART at start of treatment _ at end . Retest ADD lift next visit if PART is _: progressing 10/5/21  Current: 2/5 adduction lift test bilaterally - 12/7/21      3.  Pt will improve SALUD squat to 3/5 or better to indicate LE, pelvic, hip and back mobility needed for daily activities  Eval:SALUD squat 1/5  Last PN :3/5*  Progressing 8/30/21  Current: Pt was able to squat to parallel with good rib cage. Possible 3/5 with some reports of lateral hip tension: progressing 12/27/21     4.  Pt FOTO score will increase by 10 points to show improvement in function. 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 Also noted overall improvement with standing however flare ups seem to coincide with the start of her cycle.: progressing well 11/5/21  Current: : pt was able to stand and walk for 3 hours on a tour however reported some tightness in bilateral lateral hips.  Victor Hugo Torres progressing 12/21/21     1.  Pt will be able to return to or start an exercise program without reoccurrence of pain to allow to return to healthy lifestyle.   Current: Progressing 12/14/21 updated this session   Updated with paraspinal release : progressing 12/27/21       PLAN  []  Upgrade activities as tolerated     [x]  Continue plan of care  []  Update interventions per flow sheet       []  Discharge due to:_  []  Other:_      Sugar Pollock, PT, DPT 1/4/2022  5:27 PM    Future Appointments   Date Time Provider Kevin Newberry   1/11/2022  5:15 PM Allison Pipes, PT, DPT [facility]   1/18/2022  5:15 PM Allison Pipes, PT, DPT [facility]   1/25/2022  5:15 PM Allison Pipes, PT, DPT [facility]

## 2022-01-11 ENCOUNTER — HOSPITAL ENCOUNTER (OUTPATIENT)
Dept: PHYSICAL THERAPY | Age: 32
Discharge: HOME | End: 2022-01-11
Payer: COMMERCIAL

## 2022-01-11 PROCEDURE — 97112 NEUROMUSCULAR REEDUCATION: CPT

## 2022-01-11 PROCEDURE — 97140 MANUAL THERAPY 1/> REGIONS: CPT

## 2022-01-11 PROCEDURE — 97530 THERAPEUTIC ACTIVITIES: CPT

## 2022-01-11 NOTE — PROGRESS NOTES
PT DAILY TREATMENT NOTE    Patient Name: Darryle Sally  Date:2022  : 1990  [x]  Patient  Verified  Payor: SELF PAY / Plan: [facility] FLAT RATE SELF PAY / Product Type: Self Pay /    In time:5:15  pm  Out time:6:00 pm  Total Treatment Time (min): 45  Total Timed Codes (min): 45  1:1 Treatment Time (MC/BCBS only): 45   Visit #: 28 of 29    Treatment Dx: Other low back pain [M54.59]  SI (sacroiliac) joint dysfunction [M53.3]    SUBJECTIVE  Pain Level (0-10 scale): 2  Any medication changes, allergies to medications, adverse drug reactions, diagnosis change, or new procedure performed?: [x] No    [] Yes (see summary sheet for update)  Subjective functional status/changes:   [] No changes reported  \"Ok. More good days than bad days. \"  Reports no pain or \"flare-ups\" with walking program or home strengthening exercises. OBJECTIVE    15 min Therapeutic Activity:  [x]  See flow sheet :reviewed HEP - bench planks - discussed trying all 3 strengthening exercises after walking - lower reps more sets for bench palnks   Rationale: increase ROM, increase strength, improve coordination and increase proprioception  to improve the patients ability to perform daily activities with decreased pain and symptom levels       22 min Neuromuscular Re-education:  [x]  See flow sheet :   Rationale: increase ROM, increase strength, improve coordination and increase proprioception  to improve the patients ability to perform daily activities with decreased pain and symptom levels      8 min Manual Therapy:  Instrument augmented STM to bilateral adductors in 90-90    Rationale: decrease pain, increase ROM, increase tissue extensibility, decrease trigger points and increase postural awareness to improve the patients ability to perform daily activities with decreased pain and symptom levels    The manual therapy interventions were performed at a separate and distinct time from the therapeutic activities interventions. With   [] TE   [] TA   [] neuro   [] other: Patient Education: [x] Review HEP    [] Progressed/Changed HEP based on:   [] positioning   [] body mechanics   [] transfers   [] heat/ice application    [] other:      Other Objective/Functional Measures:   SALUD Special Tests (pre/post)  HGIR:                                                 Right: 90             Left: 90  Hip Add Drop Test:                           Right: -              Left:-  Passive Abduction Raise  Right: -   Left  Trunk Rot                                            Right: 15 in/14 in Left 15 in /14 in  Shoulder Horizontal Abduction          Right: [facility]              Left: 34 /40  Hip adduction Lift   Right: 2/5  Left 2/5    Hip IR     Right: 30  Left: 30  Hip ER     Right: 25  Left:26       Pain Level (0-10 scale) post treatment: 1    ASSESSMENT/Changes in Function:   Pt reports having some days with no pain and has been able to tolerate weekly strengthening exercises. Pt has been seen in therapy 28x including initial eval. Pt has 3 year hx of SIJ and LBP coinciding with chronic low grade bladder infection that onset ~3 years ago. Pt is currently being seen 1 visit per month for pelvic floor PT with dry needling. At present current signs/symptoms consistent with lumbo-pelvic dysfunction and SIJ dysfunction, with pain into lower abdomen , bilateral adductors and right SI.  Pt has reported significant  improvement in sx's since starting therapy and  reported increased ability to tolerate daily activities and overall reduction in pain level. Pt has also reported ability to self manage pain and sx's during a \"flare up\" with performing HEP. Pt has reported increased frequency of pain free days, Has goal of returning to exercise. Have tried to gradually introduce strengthening exercises with intermittent set backs.          Patient will continue to benefit from skilled PT services to modify and progress therapeutic interventions, address functional mobility deficits, address ROM deficits, address strength deficits, analyze and address soft tissue restrictions, analyze and cue movement patterns, analyze and modify body mechanics/ergonomics, assess and modify postural abnormalities and instruct in home and community integration to attain remaining goals. [x]  See Plan of Care  []  See progress note/recertification  []  See Discharge Summary         Progress towards goals / Updated goals:  Short Term Goals: STG- To be accomplished in 5 treatment(s):  1.  Pt will be independent with HEP to encourage prophylaxis. Eval:dispensed  Last PN MET - 8/19/21 - pt compliant updated this session.   Added adductor inhibition : 8/30/21     Long Term Goals: LTG- To be accomplished in 14 treatment(s):  1.  Pt will improve hip adduction drop to negative to indicate improved femoral-acetabular mobility needed for gait. .  Eval:positive bilaterally   Last PN: ; Right -; left - :MET 11/4/21        2.  Pt will improve hip adduction lift test to 4/5 bilaterally to indicate proper pelvic position needed for walking .  Eval:0/5 bilaterally  Last PN + PART at start of treatment _ at end . Retest ADD lift next visit if PART is _: progressing 10/5/21  Current: 2/5 adduction lift test bilaterally - 1/11/22     3.  Pt will improve SALUD squat to 3/5 or better to indicate LE, pelvic, hip and back mobility needed for daily activities  Eval:SALUD squat 1/5  Last PN :3/5*  Progressing 8/30/21  Current: Pt was able to squat to parallel with good rib cage. Possible 3/5 with some reports of lateral hip tension: progressing 12/27/21     4.  Pt FOTO score will increase by 10 points to show improvement in function. 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  Also noted overall improvement with standing however flare ups seem to coincide with the start of her cycle.: progressing well 11/5/21  Current: : pt was able to stand and walk for 3 hours on a tour however reported some tightness in bilateral lateral hips. : progressing 1/11/21     1.  Pt will be able to return to or start an exercise program without reoccurrence of pain to allow to return to healthy lifestyle.   Current: Progressing 1/11/21 has been walking and doing short strengthening exercise routine 2-3 times a week for last 3 weeks with no \"flare-ups\"     PLAN  []  Upgrade activities as tolerated     [x]  Continue plan of care  []  Update interventions per flow sheet       []  Discharge due to:_  []  Other:_      Landy Duenas, PT, DPT 1/11/2022  2:46 PM    Future Appointments   Date Time Provider Kevin Newberry   1/11/2022  5:15 PM Rosalia Rater, PT, DPT [facility]   1/18/2022  5:15 PM Rosalia Rater, PT, DPT [facility]   1/25/2022  5:15 PM Rosalia Rater, PT, DPT [facility]

## 2022-01-12 NOTE — PROGRESS NOTES
[facility] at the [address], [facility] [place], [address] [facility]  Phone: [phone]      Fax:  [phone]    Progress Note  Patient name: Taylor Batista Start of Care: 2021   Referral source: Iggy Anthony MD : 1990               Medical Diagnosis: Low back pain [M54.5]  Sacroiliac joint pain [M53.3]    Onset Date:3 years ago2               Treatment Diagnosis: Lumbopelvic dysfunction    Prior Hospitalization: see medical history Provider#: [id number]   Medications: Verified on Patient summary List    Comorbidities: hx of chronic bladder infection   Prior Level of Function: Increase in pain 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, 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- To be accomplished in 14 treatment(s):  1.  Pt will improve hip adduction drop to negative to indicate improved femoral-acetabular mobility needed for gait. .  Eval:positive bilaterally   Last PN: ; Right -; left - :MET 21        2.  Pt will improve hip adduction lift test to 4/5 bilaterally to indicate proper pelvic position needed for walking .  Eval:0/5 bilaterally  Last PN + PART at start of treatment _ at end . Retest ADD lift next visit if PART is _: progressing 10/5/21  Current: 2/5 adduction lift test bilaterally - 22     3.  Pt will improve SALUD squat to 3/5 or better to indicate LE, pelvic, hip and back mobility needed for daily activities  Eval:SALUD squat 1/5  Last PN :3/5*  Progressing 21  Current: Pt was able to squat to parallel with good rib cage. Possible 3/5 with some reports of lateral hip tension: progressing 21     4.  Pt FOTO score will increase by 10 points to show improvement in function. Eval:60  Last PN:Progressing 10/5/21 61     5.  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 Also noted overall improvement with standing however flare ups seem to coincide with the start of her cycle.: progressing well 11/5/21  Current: : pt was able to stand and walk for 3 hours on a tour however reported some tightness in bilateral lateral hips. : progressing 1/11/21     1. Pt will be able to return to or start an exercise program without reoccurrence of pain to allow to return to healthy lifestyle.   Current: Progressing 1/11/21 has been walking and doing short strengthening exercise routine 2-3 times a week for last 3 weeks with no \"flare-ups\"     Key Functional Changes:   Pt has been seen in therapy 28x including initial eval. Pt has 3 year hx of SIJ and LBP coinciding with chronic low grade bladder infection that onset ~3 years ago. Pt is currently being seen 1 visit per month for pelvic floor PT with dry needling. At present current signs/symptoms consistent with lumbo-pelvic dysfunction and SIJ dysfunction, with pain into lower abdomen , bilateral adductors and right SI.  Pt has reported significant  improvement in sx's since starting therapy and  reported increased ability to tolerate daily activities and overall reduction in pain level. Pt has also reported ability to self manage pain and sx's during a \"flare up\" with performing HEP. Pt has reported increased frequency of pain free days, Has goal of returning to exercise. Have tried to gradually introduce strengthening exercises with intermittent set backs. Pt does report feels moving in right direction has had intermittent days with no pain.      Updated Goals: to be achieved in 6 treatments:  Same as above     ASSESSMENT/RECOMMENDATIONS:  [x]Continue therapy per initial plan/protocol at a frequency of  6 treatments  []Continue therapy with the following recommended changes:_____________________      _____________________________________________________________________  []Discontinue therapy progressing towards or have reached established goals  []Discontinue therapy due to lack of appreciable progress towards goals  []Discontinue therapy due to lack of attendance or compliance  []Await Physician's recommendations/decisions regarding therapy  []Other:________________________________________________________________    Thank you for this referral.   Tiara Figueroa PT, DPT 1/11/2022 7:22 PM

## 2022-01-14 ENCOUNTER — TELEPHONE (OUTPATIENT)
Dept: PHYSICAL THERAPY | Age: 32
End: 2022-01-14

## 2022-01-18 ENCOUNTER — APPOINTMENT (OUTPATIENT)
Dept: PHYSICAL THERAPY | Age: 32
End: 2022-01-18
Payer: COMMERCIAL

## 2022-01-25 ENCOUNTER — HOSPITAL ENCOUNTER (OUTPATIENT)
Dept: PHYSICAL THERAPY | Age: 32
Discharge: HOME | End: 2022-01-25
Payer: COMMERCIAL

## 2022-01-25 PROCEDURE — 97140 MANUAL THERAPY 1/> REGIONS: CPT

## 2022-01-25 NOTE — PROGRESS NOTES
PT DAILY TREATMENT NOTE    Patient Name: Katheran Boeck  Date:2022  : 1990  [x]  Patient  Verified  Payor: Angelita Forman / Plan: Dre Mcnair [id number] PPO / Product Type: PPO /    In time:5:20 pm  Out time:6:10 pm   Total Treatment Time (min): 50  Total Timed Codes (min): 50  1:1 Treatment Time (MC/BCBS only): 50   Visit #: 29 of 35    Treatment Dx: Other low back pain [M54.59]  SI (sacroiliac) joint dysfunction [M53.3]    SUBJECTIVE  Pain Level (0-10 scale): 2-3  Any medication changes, allergies to medications, adverse drug reactions, diagnosis change, or new procedure performed?: [x] No    [] Yes (see summary sheet for update)  Subjective functional status/changes:   [] No changes reported  \"I feel tight, I would not call it pain\"  Pt reported no \"flares\" since last session, but has not been able to attend PT due to insurance reasons. Pt also contracted COVID since last session - did not feel able to complete HEP due to not feeling well. Was able walk for exercise and perform some of strengthening exercises.      OBJECTIVE      7 min Therapeutic Activity:  [x]  See flow sheet :   Rationale: increase ROM, increase strength, improve coordination and increase proprioception  to improve the patients ability to perform daily activities with decreased pain and symptom levels       5 min Neuromuscular Re-education:  [x]  See flow sheet :   Rationale: increase ROM, increase strength, improve coordination and increase proprioception  to improve the patients ability to perform daily activities with decreased pain and symptom levels      38 min Manual Therapy:  SALUD AIC correction, Sternal mobilization with active pelvic tilt, Superior T4 (SALUD technique), Right subclavius release (SALUD technique), SALUD infraclavicular rib pump with active breath   Vacuum cupping in child's pose to thoracic and lumbar paraspinals  Instrument augmented STM to bilateral adductors in 90-90   Obtained consent for hand placement Rationale: decrease pain, increase ROM, increase tissue extensibility, decrease trigger points and increase postural awareness to improve the patients ability to perform daily activities with decreased pain and symptom levels    The manual therapy interventions were performed at a separate and distinct time from the therapeutic activities interventions. With   [] TE   [] TA   [] neuro   [] other: Patient Education: [x] Review HEP    [] Progressed/Changed HEP based on:   [] positioning   [] body mechanics   [] transfers   [] heat/ice application    [] other:      Other Objective/Functional Measures:   SALUD Special Tests (pre/post)  HGIR:                                                 Right: 80/90             Left: 90  Hip Add Drop Test:                           Right: midline/-         Left:midline/-  Passive Abduction Raise Test: Right: +/-  Left: +/-  Trunk Rot                                            Right: 15 in/13 in Left 14 in /13 in  Shoulder Horizontal Abduction           Right: 40 /45             Left: 31 /40     Pain Level (0-10 scale) post treatment: 1-2    ASSESSMENT/Changes in Function:   Pt has not been seen in 2 weeks due to insurance complications. Pt was able to maintain progress without significant regression including \"flare up\" or increase in pain. Reports overall increased tolerance to standing in class. Has been able to perform 2 of 3 strengthening exercises after TM walking without acute increase in pain or soreness. Overall pt progressing very nicely.      Patient will continue to benefit from skilled PT services to modify and progress therapeutic interventions, address functional mobility deficits, address ROM deficits, address strength deficits, analyze and address soft tissue restrictions, analyze and cue movement patterns, analyze and modify body mechanics/ergonomics, assess and modify postural abnormalities and instruct in home and community integration to attain remaining goals. [x]  See Plan of Care  []  See progress note/recertification  []  See Discharge Summary         Progress towards goals / Updated goals:  Long Term Goals: LTG- To be accomplished in 14 treatment(s):  1.  Pt will improve hip adduction drop to negative to indicate improved femoral-acetabular mobility needed for gait. .  Eval:positive bilaterally   Last PN: ; Right -; left - :MET 11/4/21        2.  Pt will improve hip adduction lift test to 4/5 bilaterally to indicate proper pelvic position needed for walking .  Eval:0/5 bilaterally  Last PN: 2/5 adduction lift test bilaterally - 1/11/22     3.  Pt will improve SALUD squat to 3/5 or better to indicate LE, pelvic, hip and back mobility needed for daily activities  Eval:SALUD squat 1/5  Last PN :3/5*  Pt was able to squat to parallel with good rib cage. Possible 3/5 with some reports of lateral hip tension: progressing 12/27/21     4.  Pt FOTO score will increase by 10 points to show improvement in function. 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 : progressing 1/11/21     1.  Pt will be able to return to or start an exercise program without reoccurrence of pain to allow to return to healthy lifestyle.   Last PN: Progressing 1/11/21 has been walking and doing short strengthening exercise routine 2-3 times a week for last 3 weeks with no \"flare-ups\"   Current: Progressing 1/25/22 increased tolerance to bridges and planks, had to take break due to being sick.     PLAN  [x]  Upgrade activities as tolerated     [x]  Continue plan of care  []  Update interventions per flow sheet       []  Discharge due to:_  []  Other:_      Mukesh Wheeler, PT, DPT 1/25/2022  5:13 PM    Future Appointments   Date Time Provider Kevin Newberry   1/25/2022  5:15 PM Vianney Lamonte, PT, DPT [facility]   2/10/2022  4:30 PM Vianney Lamonte, PT, DPT [facility]   2/22/2022  6:00 PM Vianney Lamonte, PT, DPT [facility]   3/8/2022  5:15 PM Vianney Lamonte, PT, DPT [facility]

## 2022-02-01 ENCOUNTER — HOSPITAL ENCOUNTER (OUTPATIENT)
Dept: PHYSICAL THERAPY | Age: 32
Discharge: HOME | End: 2022-02-01
Payer: COMMERCIAL

## 2022-02-01 PROCEDURE — 97140 MANUAL THERAPY 1/> REGIONS: CPT

## 2022-02-01 NOTE — PROGRESS NOTES
PT DAILY TREATMENT NOTE    Patient Name: Nisreen Wiley  NUXC:[id number]  : 1990  [x]  Patient  Verified  Payor: /    In time:5:22 pm  Out time:6:08 pm   Total Treatment Time (min): 46  Total Timed Codes (min): 46  1:1 Treatment Time (MC/BCBS only): 46   Visit #: 30 of 35    Treatment Dx: Other low back pain [M54.59]  SI (sacroiliac) joint dysfunction [M53.3]    SUBJECTIVE  Pain Level (0-10 scale): 3  Any medication changes, allergies to medications, adverse drug reactions, diagnosis change, or new procedure performed?: [x] No    [] Yes (see summary sheet for update)  Subjective functional status/changes:   [] No changes reported  \"Just tight thru here (indicated adductors). \"  Reported that did have some low back pain yesterday but was able to do exercises this morning and back pain subsided.     OBJECTIVE    6 min Therapeutic Activity:  [x]  See flow sheet :   Rationale: increase ROM, increase strength, improve coordination and increase proprioception  to improve the patients ability to perform daily activities with decreased pain and symptom levels       7 min Neuromuscular Re-education:  [x]  See flow sheet :   Rationale: increase ROM, increase strength, improve coordination and increase proprioception  to improve the patients ability to perform daily activities with decreased pain and symptom levels      33 min Manual Therapy:  SALUD AIC correction, Sternal mobilization with active pelvic tilt, Superior T4 (SALUD technique), Right subclavius release (SALUD technique), SALUD infraclavicular rib pump with active breath   Obtained consent for hand placement   Vacuum cupping to lumbar and thoracic paraspinals in child's pose  Instrument augmented STM to bilateral adductors in 90-90   Rationale: decrease pain, increase ROM, increase tissue extensibility, decrease trigger points and increase postural awareness to improve the patients ability to perform daily activities with decreased pain and symptom levels    The manual therapy interventions were performed at a separate and distinct time from the therapeutic activities interventions. With   [] TE   [] TA   [] neuro   [] other: Patient Education: [x] Review HEP    [] Progressed/Changed HEP based on:   [] positioning   [] body mechanics   [] transfers   [] heat/ice application    [] other:      Other Objective/Functional Measures:   SALUD Special Tests (pre/post)  HGIR:                                                 Right: 75/90             Left: 80/90  Hip Add Drop Test:                           Right: -              Left:midline/-  Passive Abduction Raise  Right: -   Left: +  Trunk Rot                                            Right: 15 in/14 in Left 15.5 in /14 in   Shoulder Horizontal Abduction           Right: 40 /NT             Left: 34 /45    Hip adduction Lift   Right: 3/5  Left 3/5       Pain Level (0-10 scale) post treatment: 0    ASSESSMENT/Changes in Function:   Pt reported no flares and able to comply with all 3 strengthening exercises 3 times since last session. At present reports minimal pain more adductor tightness. Discussed this session increasing sets to 3 sets with 8-10 reps of strengthening exercises. Patient will continue to benefit from skilled PT services to modify and progress therapeutic interventions, address functional mobility deficits, address ROM deficits, address strength deficits, analyze and address soft tissue restrictions, analyze and cue movement patterns, analyze and modify body mechanics/ergonomics, assess and modify postural abnormalities and instruct in home and community integration to attain remaining goals.      [x]  See Plan of Care  []  See progress note/recertification  []  See Discharge Summary         Progress towards goals / Updated goals:  Long Term Goals: LTG- To be accomplished in 14 treatment(s):  1.  Pt will improve hip adduction drop to negative to indicate improved femoral-acetabular mobility needed for gait. .  Eval:positive bilaterally   Last PN: ; Right -; left - :MET 11/4/21        2.  Pt will improve hip adduction lift test to 4/5 bilaterally to indicate proper pelvic position needed for walking .  Eval:0/5 bilaterally  Last PN: 2/5 adduction lift test bilaterally - 1/11/22  Current: Progressing 2/1/22  Hip adduction Lift   Right: 3/5  Left 3/5     3.  Pt will improve SALUD squat to 3/5 or better to indicate LE, pelvic, hip and back mobility needed for daily activities  Eval:SALUD squat 1/5  Last PN :3/5*  Pt was able to squat to parallel with good rib cage. Possible 3/5 with some reports of lateral hip tension: progressing 12/27/21     4.  Pt FOTO score will increase by 10 points to show improvement in function. 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 : progressing 1/11/21     1.  Pt will be able to return to or start an exercise program without reoccurrence of pain to allow to return to healthy lifestyle.   Last PN: Progressing 1/11/21 has been walking and doing short strengthening exercise routine 2-3 times a week for last 3 weeks with no \"flare-ups\"   Current: Progressing 1/25/22 increased tolerance to bridges and planks, had to take break due to being sick.     PLAN  []  Upgrade activities as tolerated     [x]  Continue plan of care  []  Update interventions per flow sheet       []  Discharge due to:_  []  Other:_      Idania Bowen PT, DPT 2/1/2022  5:25 PM    Future Appointments   Date Time Provider Kevin Newberry   2/10/2022  4:30 PM Micheal Tabares PT, DPT [facility]   2/22/2022  6:00 PM Micheal Tabares PT, DPT [facility]   3/8/2022  5:15 PM Micheal Tabares PT, DPT [id number]

## 2022-02-10 ENCOUNTER — HOSPITAL ENCOUNTER (OUTPATIENT)
Dept: PHYSICAL THERAPY | Age: 32
Discharge: HOME | End: 2022-02-10
Payer: COMMERCIAL

## 2022-02-10 PROCEDURE — 97140 MANUAL THERAPY 1/> REGIONS: CPT

## 2022-02-10 NOTE — PROGRESS NOTES
PT DAILY TREATMENT NOTE    Patient Name: Carlos Enrique Fonseca  Date:2/10/2022  : 1990  [x]  Patient  Verified  Payor: BLUE CROSS / Plan: Dre Mcnair [id number] PPO / Product Type: PPO /    In time:4:35 pm  Out time:5:25 pm  Total Treatment Time (min): 50  Total Timed Codes (min): 50  1:1 Treatment Time (MC/BCBS only): 50   Visit #: 31 of 35    Treatment Dx: Other low back pain [M54.59]  SI (sacroiliac) joint dysfunction [M53.3]    SUBJECTIVE  Pain Level (0-10 scale): 3  Any medication changes, allergies to medications, adverse drug reactions, diagnosis change, or new procedure performed?: [x] No    [] Yes (see summary sheet for update)  Subjective functional status/changes:   [] No changes reported  \"Better than I was. Today is a better day. \"    OBJECTIVE    7 min Therapeutic Activity:  [x]  See flow sheet :   Rationale: increase ROM, increase strength, improve coordination and increase proprioception  to improve the patients ability to perform daily activities with decreased pain and symptom levels       7 min Neuromuscular Re-education:  [x]  See flow sheet :   Rationale: increase ROM, increase strength, improve coordination and increase proprioception  to improve the patients ability to perform daily activities with decreased pain and symptom levels      36 min Manual Therapy:   SALUD AIC correction, Sternal mobilization with active pelvic tilt, Superior T4 (SALUD technique), Right subclavius release (SALUD technique), SALUD infraclavicular rib pump with active breath   Obtained consent for hand placement   Vacuum cupping to lumbar and thoracic paraspinals in child's pose  Instrument augmented STM to bilateral adductors in 90-90   Rationale: decrease pain, increase ROM, increase tissue extensibility, decrease trigger points and increase postural awareness to improve the patients ability to perform daily activities with decreased pain and symptom levels    The manual therapy interventions were performed at a separate and distinct time from the therapeutic activities interventions. With   [] TE   [] TA   [] neuro   [] other: Patient Education: [x] Review HEP    [] Progressed/Changed HEP based on:   [] positioning   [] body mechanics   [] transfers   [] heat/ice application    [] other:      Other Objective/Functional Measures:   SALUD Special Tests (pre/post)  HGIR:                                                 Right: 79/90             Left: 90  Hip Add Drop Test:                           Right: -              Left:midline/-  Passive Abduction Raise  Right: +  Left: +  Pelvic Red Willow Drop   Right:NT/-  Left: NT/2 in from table  Trunk Rot                                            Right: 15 in/14 in Left 15 in /14 in  Shoulder Horizontal Abduction           Right: 42 /NT             Left: 35 /NT        Pain Level (0-10 scale) post treatment: 1    ASSESSMENT/Changes in Function:   Pt reported decreased compliance with repositioning exercises due to traveling out of town. Had increased personal and work stress in the last week which could be related to pain. Pt did report that when had increased pelvic pain and left sided buttock pain was able to decrease with HEP. Overall pt has demonstrated dramatic increase in ability to control pain and decrease pain with HEP. Patient will continue to benefit from skilled PT services to modify and progress therapeutic interventions, address functional mobility deficits, address ROM deficits, address strength deficits, analyze and address soft tissue restrictions, analyze and cue movement patterns, analyze and modify body mechanics/ergonomics, assess and modify postural abnormalities and instruct in home and community integration to attain remaining goals.      [x]  See Plan of Care  []  See progress note/recertification  []  See Discharge Summary         Progress towards goals / Updated goals:  Long Term Goals: LTG- To be accomplished in 14 treatment(s):  1.  Pt will improve hip adduction drop to negative to indicate improved femoral-acetabular mobility needed for gait. .  Eval:positive bilaterally   Last PN: ; Right -; left - :MET 11/4/21        2.  Pt will improve hip adduction lift test to 4/5 bilaterally to indicate proper pelvic position needed for walking .  Eval:0/5 bilaterally  Last PN: 2/5 adduction lift test bilaterally - 1/11/22  Current: Progressing 2/1/22  Hip adduction Lift                                 Right: 3/5                     Left 3/5     3.  Pt will improve SALUD squat to 3/5 or better to indicate LE, pelvic, hip and back mobility needed for daily activities  Eval:SALUD squat 1/5  Last PN :3/5*  Pt was able to squat to parallel with good rib cage. Possible 3/5 with some reports of lateral hip tension: progressing 12/27/21     4.  Pt FOTO score will increase by 10 points to show improvement in function. 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 : progressing 1/11/21  Current: progressing 2/10/22 has had pain free days and days with increased pain      1.  Pt will be able to return to or start an exercise program without reoccurrence of pain to allow to return to healthy lifestyle.   Last PN: Progressing 1/11/21 has been walking and doing short strengthening exercise routine 2-3 times a week for last 3 weeks with no \"flare-ups\"   Current: Progressing 2/10/22 increased tolerance to bridges and planks, plans to add 3 sets of all 3 exercises this week     PLAN  []  Upgrade activities as tolerated     [x]  Continue plan of care  []  Update interventions per flow sheet       []  Discharge due to:_  []  Other:_      Sugar Pollock, PT, DPT 2/10/2022  4:39 PM    Future Appointments   Date Time Provider Kevin Newberry   2/22/2022  6:00 PM Valente Moreno, SHARRI RODRIGUEZ [facility]   3/8/2022  5:15 PM Augustina Granados PT, NIKUNJT MICHAEL [facility]

## 2022-02-11 NOTE — PROGRESS NOTES
[facility] at the [address], [facility] [place] Kevin blair, [address]  Phone: [phone]      Fax:  [phone]    Progress Note  Patient name: Taylor Adkins Start of Care: 2021   Referral source: Roxie Anthony MD : 1990               Medical Diagnosis: Low back pain [M54.5]  Sacroiliac joint pain [M53.3]    Onset Date:3 years ago2               Treatment Diagnosis: Lumbopelvic dysfunction    Prior Hospitalization: see medical history Provider#: [id number]   Medications: Verified on Patient summary List    Comorbidities: hx of chronic bladder infection   Prior Level of Function: Increase in pain 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, 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, teaching, 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, wakes up \"stiff\", sitting prolonged period of time, anything for a prolonged period of time        Visits from Start of Care: 31    Missed Visits: 0    Progress Towards Goals: ***    Key Functional Changes: ***  Updated Goals: to be achieved in *** {BSI OP WEEKS/TREATMENTS:}:   ***  ASSESSMENT/RECOMMENDATIONS:  []Continue therapy per initial plan/protocol at a frequency of  *** x per week for *** weeks  []Continue therapy with the following recommended changes:_____________________      _____________________________________________________________________  []Discontinue therapy progressing towards or have reached established goals  []Discontinue therapy due to lack of appreciable progress towards goals  []Discontinue therapy due to lack of attendance or compliance  []Await Physician's recommendations/decisions regarding therapy  []Other:________________________________________________________________    Thank you for this referral.   Edmund Benitez PT, DPT 2/10/2022 7:22 PM

## 2022-02-13 NOTE — PROGRESS NOTES
[facility] at [address] Phone: [phone]      Fax:  [phone] Plan of Care/ Statement of Necessity for Physical Therapy Services Patient name: Kayla Coleman Start of Care: 2019 Referral source: Carlie Mojica MD : 1990 Medical Diagnosis: Low back pain [M54.5] Payor: [employer] / Plan: [facility] [employer]WORK OTHER / Product Type: PPO /  Onset Date:2018with recent exaccerbations and more frequent pain Treatment Diagnosis: Pelvic obliquity, Neural tension (B) LE in the sciatic nerve pattern Prior Hospitalization: see medical history Provider#: [id number] Medications: Verified on Patient summary List  
 Comorbidities: Pelvic Floor Dysfunction Prior Level of Function: walk 3 times a day for a mile and a half with pain 2-3 times a week The Plan of Care and following information is based on the information from the initial evaluation. Assessment/ key information: Patient is a 33 y.o. female referred to PT with the above Dx. 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 She reports radicular symptoms into the left lateral thigh, buttocks and low back. She reports that she is seeing a therapist for pelvic floor issues once a week in [place]. Current pain is now daily and limits the ability to walk as much. Patient presents to PT with an impaired gait, decreased balance, decreased strength, decreased flexibility, and decreased mobility. Patient s/s appear to be consistent w/ diagnosis. Patient demonstrates the potential to make functional gains within a reasonable time frame. Patient will benefit from skilled PT to address impairments and improve functional mobility, strength, gait and balance for an improved quality of life. Fall Risk Assessment: Patient demonstrates no Fall Risk Evaluation Complexity History MEDIUM  Complexity : 1-2 comorbidities / personal factors will impact the outcome/ POC ; Examination MEDIUM Complexity : 3 Standardized tests and measures addressing body structure, function, activity limitation and / or participation in recreation  ;Presentation LOW Complexity : Stable, uncomplicated  ;Clinical Decision Making MEDIUM Complexity : FOTO score of 26-74 Overall Complexity Rating: LOW Problem List: pain affecting function, decrease ROM, decrease strength, impaired gait/ balance, decrease ADL/ functional abilitiies, decrease activity tolerance, decrease flexibility/ joint mobility, decrease transfer abilities and other FOTO = 53 Treatment Plan may include any combination of the following: Therapeutic exercise, Therapeutic activities, Neuromuscular re-education, Physical agent/modality, Gait/balance training, Manual therapy, Patient education, Self Care training and Functional mobility training Patient / Family readiness to learn indicated by: asking questions, trying to perform skills and interest 
Persons(s) to be included in education: patient (P) Barriers to Learning/Limitations: None Patient Goal (s): No more pain, help with the pelvic floor problems Patient Self Reported Health Status: fair Rehabilitation Potential: good OBJECTIVE/EXAMINATION Gait: Decr Trunk Rot 
- Left lateral shift - Decr mobility left Innominate in stork stance 
- elev Right crest 
- Ant Rot left ASIS 
- Post Rot left Innominate - TTP with multiple trigger points right  > left - TTP (B) distal ADD region - Elev right sacral base - Trunk Rot WNL 
- Fingertip - floor at 5cm with Left SI joint pain 
- Fair stability with half Roll - Decr initiation right multifidus AROM PROM Strength Pain? ?  
 Right Left Right Left Right Left Hip Flx     4 3+ Hip Ext Knee Flx     4+ 5-   
Knee Ext     5 5 DF         
PF Toe DF Toe PF         
HS 90/90 152 150 SLR         
         
         
 
 
 Short Term Goals: To be accomplished in 5 treatments: 1. Pt will be compliant and independent with HEP in order to facilitate PT sessions and aid with self management Eval Status:  Initiated Current Status: 2. Pt to tolerate 30 min or more of TE and/or Interventions w/o increased s/s Eval Status:  Initiated Current Status: 
   
Long Term Goals: To be accomplished in 10 treatments: 1. Pt will demonstrate stoop and lift of 30# with no added pain for carryover to moderate lifting with usual daily work as a teacher Eval Status:  Initiated Current Status: 3. Pt will demonstrate good pelvic mobility upon arrival to therapy session to show good core stability to tolerate changing positions without difficulty Eval Status:  Initiated Current Status: 4. Pt will will ambulate 1 mile on TM with good tolerance and no added pain to return to walking for exercise Eval Status:  Initiated Current Status: 5. Pt will improve FOTO score to 72 in 10 visits to show significant improvement in function for progress to little difficulty performing usual work, activities and hobbies Eval Status: 48 Current Status: 
 
Frequency / Duration: Patient to be seen 2-3 times per week for 10 treatments. Patient/ Caregiver education and instruction: Diagnosis, prognosis, self care, activity modification and exercises 
 [x]  Plan of care has been reviewed with PRESLEY Neil, PT 2/13/2019 3:48 PM 
_____________________________________________________________________ I certify that the above Therapy Services are being furnished while the patient is under my care. I agree with the treatment plan and certify that this therapy is necessary. [de-identified] Signature:____________Date:_________TIME:________ 
 
[employer], Date and Time must be completed for valid certification ** Please sign and return to [facility] at [address] Phone: [phone]      Fax:  [phone] patient refuses the vaccine

## 2022-02-22 ENCOUNTER — HOSPITAL ENCOUNTER (OUTPATIENT)
Dept: PHYSICAL THERAPY | Age: 32
Discharge: HOME | End: 2022-02-22
Payer: COMMERCIAL

## 2022-02-22 PROCEDURE — 97140 MANUAL THERAPY 1/> REGIONS: CPT

## 2022-02-22 NOTE — PROGRESS NOTES
PT DAILY TREATMENT NOTE    Patient Name: Al Grant  Date:2022  : 1990  [x]  Patient  Verified  Payor: BLUE CROSS / Plan: Dre Mcnair [id number] PPO / Product Type: PPO /    In time:6:02 pm pm  Out time:7:00 pm   Total Treatment Time (min): 55 (waiting on PT)  Total Timed Codes (min): 55  1:1 Treatment Time (MC/BCBS only): 45   Visit #: 28 of 35    Treatment Dx: Other low back pain [M54.59]  SI (sacroiliac) joint dysfunction [M53.3]    SUBJECTIVE  Pain Level (0-10 scale): 3  Any medication changes, allergies to medications, adverse drug reactions, diagnosis change, or new procedure performed?: [x] No    [] Yes (see summary sheet for update)  Subjective functional status/changes:   [] No changes reported  \"Today feels tight\"  Reported starting new medication for yeast - taking for 7 days. Slight upset stomach and tightness.   Increased familial stress      OBJECTIVE    7 min Therapeutic Activity:  [x]  See flow sheet :   Rationale: increase ROM, increase strength, improve coordination and increase proprioception  to improve the patients ability to perform daily activities with decreased pain and symptom levels       14 min Neuromuscular Re-education:  [x]  See flow sheet :   Rationale: increase ROM, increase strength, improve coordination and increase proprioception  to improve the patients ability to perform daily activities with decreased pain and symptom levels      34 min Manual Therapy:  SALUD AIC correction, Sternal mobilization with active pelvic tilt, Superior T4 (SALUD technique), Right subclavius release (SALUD technique), SALUD infraclavicular rib pump with active breath   Obtained consent for hand placement   Vacuum cupping to lumbar and thoracic paraspinals in child's pose  Instrument augmented STM to bilateral adductors in 90-90   Rationale: decrease pain, increase ROM, increase tissue extensibility, decrease trigger points and increase postural awareness to improve the patients ability to perform daily activities with decreased pain and symptom levels    The manual therapy interventions were performed at a separate and distinct time from the therapeutic activities interventions. With   [] TE   [] TA   [] neuro   [] other: Patient Education: [x] Review HEP    [] Progressed/Changed HEP based on:   [] positioning   [] body mechanics   [] transfers   [] heat/ice application    [] other:      Other Objective/Functional Measures:   SALUD Special Tests (pre/post)  HGIR:                                                 Right: 90             Left: 90  Hip Add Drop Test:                           Right: -              Left:-  Trunk Rot                                            Right: 15 in/13 in  Left 15 in /13 in  Shoulder Horizontal Abduction           Right: 45 /NT             Left: 36 /NT     Hip Add Lift (scored 0-5)  Right: 2/2  Left: 2/2-3    Pain Level (0-10 scale) post treatment: 0    ASSESSMENT/Changes in Function:   Updated repositioning exercises at home to include standing supported left posterior hip. Focussed on left adductor,right glut max and left posterior hip to improve hip adduction lift scores. Tolerated well. Challenged with left adductor recruitment. Overall pt is doing very well. Was able to complete all 3 strengthening exercises without flare or pain. Has had increased stress which has increased tightness. Plan to add standing RDL to HEP next session if lift test scores improve. Patient will continue to benefit from skilled PT services to modify and progress therapeutic interventions, address functional mobility deficits, address ROM deficits, address strength deficits, analyze and address soft tissue restrictions, analyze and cue movement patterns, analyze and modify body mechanics/ergonomics, assess and modify postural abnormalities and instruct in home and community integration to attain remaining goals.      [x]  See Plan of Care  []  See progress note/recertification  []  See Discharge Summary         Progress towards goals / Updated goals:  Long Term Goals: LTG- To be accomplished in 14 treatment(s):  1.  Pt will improve hip adduction drop to negative to indicate improved femoral-acetabular mobility needed for gait. .  Eval:positive bilaterally   Last PN: ; Right -; left - :MET 11/4/21        2.  Pt will improve hip adduction lift test to 4/5 bilaterally to indicate proper pelvic position needed for walking .  Eval:0/5 bilaterally  Last PN: 2/5 adduction lift test bilaterally - 1/11/22  Current: Progressing 2/1/22  Hip adduction Lift            Right: 3/5                     Left 3/5     3.  Pt will improve SALUD squat to 3/5 or better to indicate LE, pelvic, hip and back mobility needed for daily activities  Eval:SALUD squat 1/5  Last PN :3/5*  Pt was able to squat to parallel with good rib cage. Possible 3/5 with some reports of lateral hip tension: progressing 12/27/21     4.  Pt FOTO score will increase by 10 points to show improvement in function. 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 : progressing 1/11/21  Current: progressing 2/10/22 has had pain free days and days with increased pain      1.  Pt will be able to return to or start an exercise program without reoccurrence of pain to allow to return to healthy lifestyle.   Last PN: Progressing 1/11/21 has been walking and doing short strengthening exercise routine 2-3 times a week for last 3 weeks with no \"flare-ups\"   Current: Progressing 2/22/22 performed all 3 exercises for 3 sets 3 times in one week with no pain      PLAN  []  Upgrade activities as tolerated     [x]  Continue plan of care  []  Update interventions per flow sheet []  Discharge due to:_  []  Other:_      Roxana Benavides PT, DPT 2/22/2022  4:15 PM    Future Appointments   Date Time Provider Kevin Newberry   2/22/2022  6:00 PM Nichole Thrasher PT, DPT [facility]   3/8/2022  5:15 PM Nichole Thrasher PT, DPT [facility]   3/15/2022  5:15 PM Nichole Thrasher PT, DPT [facility]   3/29/2022  5:15 PM Nichole Thrasher PT, DPT [facility]

## 2022-03-08 ENCOUNTER — HOSPITAL ENCOUNTER (OUTPATIENT)
Dept: PHYSICAL THERAPY | Age: 32
Discharge: HOME | End: 2022-03-08
Payer: COMMERCIAL

## 2022-03-08 PROCEDURE — 97140 MANUAL THERAPY 1/> REGIONS: CPT

## 2022-03-08 NOTE — PROGRESS NOTES
PT DAILY TREATMENT NOTE    Patient Name: Whitney Perales  Date:3/8/2022  : 1990  [x]  Patient  Verified  Payor: /    In time:5:19 pm  Out time:6:15 pm   Total Treatment Time (min): 56  Total Timed Codes (min): 56  1:1 Treatment Time (MC/BCBS only): 45   Visit #: 35 of 35    Treatment Dx: Other low back pain [M54.59]  SI (sacroiliac) joint dysfunction [M53.3]    SUBJECTIVE  Pain Level (0-10 scale): 2  Any medication changes, allergies to medications, adverse drug reactions, diagnosis change, or new procedure performed?: [x] No    [] Yes (see summary sheet for update)  Subjective functional status/changes:   [] No changes reported  Pt reported that having pain in last 2 weeks but more in adductors and aching in LE and intermittent LBP. Had more relief with strengthening exercises than with repositioning exercises.  No reports of flare ups of pelvic pain     OBJECTIVE    7 min Therapeutic Activity:  []  See flow sheet :   Rationale: increase ROM, increase strength, improve coordination and increase proprioception  to improve the patients ability to perform daily activities with decreased pain and symptom levels       7 min Neuromuscular Re-education:  []  See flow sheet :   Rationale: increase ROM, increase strength, improve coordination and increase proprioception  to improve the patients ability to perform daily activities with decreased pain and symptom levels      34 min Manual Therapy:  SALUD infraclavicular rib pump with active breath   Obtained consent for hand placement   Vacuum cupping to lumbar and thoracic paraspinals in child's pose  Instrument augmented STM to bilateral adductors in 90-90   Rationale: decrease pain, increase ROM, increase tissue extensibility, decrease trigger points and increase postural awareness to improve the patients ability to perform daily activities with decreased pain and symptom levels    The manual therapy interventions were performed at a separate and distinct time from the therapeutic activities interventions. With   [] TE   [x] TA   [x] neuro   [] other: Patient Education: [x] Review HEP    [] Progressed/Changed HEP based on:   [] positioning   [] body mechanics   [] transfers   [] heat/ice application    [] other:      Other Objective/Functional Measures:   SALUD Special Tests (pre/post)  HGIR:                                                 Right: 90             Left: 90  Hip Add Drop Test:                           Right: -              Left:-  Pelvic [place] Drop  Right:midline  Left: midline  Trunk Rot                                            Right: 14.5 in/14 in   Left 15 in /14 in  Shoulder Horizontal Abduction           Right: 45 /NT             Left: 40 /NT     Hip Add Lift (scored 0-5)  Right: 2/3  Left: 2/3       Pain Level (0-10 scale) post treatment: 1    ASSESSMENT/Changes in Function:   Pt has demonstrated good progress in last 1-2 months. Pain has remained 5/10 or less with no pelvic pain flare up. Pain has been more in adductors and aching into LE. Has demonstrated excellent ability to self manage and has been very compliant with HEP     Patient will continue to benefit from skilled PT services to modify and progress therapeutic interventions, address functional mobility deficits, address ROM deficits, address strength deficits, analyze and address soft tissue restrictions, analyze and cue movement patterns, analyze and modify body mechanics/ergonomics, assess and modify postural abnormalities and instruct in home and community integration to attain remaining goals.      [x]  See Plan of Care  []  See progress note/recertification  []  See Discharge Summary         Progress towards goals / Updated goals:  Long Term Goals: LTG- To be accomplished in 14 treatment(s):  1.  Pt will improve hip adduction drop to negative to indicate improved femoral-acetabular mobility needed for gait. .  Eval:positive bilaterally   Last PN: ; Right -; left - :MET 11/4/21        2.  Pt will improve hip adduction lift test to 4/5 bilaterally to indicate proper pelvic position needed for walking .  Eval:0/5 bilaterally  Last PN: Progressing 2/1/22  Hip adduction Lift            Right: 3/5                     Left 3/5  Current: Maintaining progress 3/3/22 3/5 bilaterally     3.  Pt will improve SALUD squat to 3/5 or better to indicate LE, pelvic, hip and back mobility needed for daily activities  Eval:SALUD squat 1/5  Last PN :3/5*  Pt was able to squat to parallel with good rib cage. Possible 3/5 with some reports of lateral hip tension: progressing 12/27/21     4.  Pt FOTO score will increase by 10 points to show improvement in function. 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  Pt will be able to return to or start an exercise program without reoccurrence of pain to allow to return to healthy lifestyle.   Last PN: Progressing 2/22/22 performed all 3 exercises for 3 sets 3 times in one week with no pain    Current: Progressing 3/8/22 proved compliant with all 3-4 strengthening exercises, plan to progress next session     PLAN  []  Upgrade activities as tolerated     [x]  Continue plan of care  []  Update interventions per flow sheet       []  Discharge due to:_  []  Other:_      Antonio Lind PT, DPT 3/8/2022  2:47 PM    Future Appointments   Date Time Provider [place]   3/8/2022  5:15 PM Kathleen Velazquez PT, DPT [facility]   3/14/2022  1:30 PM Kathleen Velazquez PT, DPT [facility]   3/29/2022  5:15 PM Kathleen Velazquez PT, DPT [facility]   5/11/2022  1:20 PM Chava Salvador[address]

## 2022-03-14 ENCOUNTER — HOSPITAL ENCOUNTER (OUTPATIENT)
Dept: PHYSICAL THERAPY | Age: 32
Discharge: HOME | End: 2022-03-14
Payer: COMMERCIAL

## 2022-03-14 PROCEDURE — 97140 MANUAL THERAPY 1/> REGIONS: CPT

## 2022-03-14 NOTE — PROGRESS NOTES
PT DAILY TREATMENT NOTE    Patient Name: Jacqueline Vargas  Date:3/14/2022  : 1990  [x]  Patient  Verified  Payor: Maribel Melendez / Plan: Dre Mcnair [id number] PPO / Product Type: PPO /    In time:1:34 pm  Out time:2:18 pm   Total Treatment Time (min): 44  Total Timed Codes (min): 44  1:1 Treatment Time (MC/BCBS only): 44   Visit #: 29 of 40    Treatment Dx: Other low back pain [M54.59]  SI (sacroiliac) joint dysfunction [M53.3]    SUBJECTIVE  Pain Level (0-10 scale): 2  Any medication changes, allergies to medications, adverse drug reactions, diagnosis change, or new procedure performed?: [x] No    [] Yes (see summary sheet for update)  Subjective functional status/changes:   [] No changes reported  \"My pelvis is fine itis my back on the left side. \"    OBJECTIVE    7 min Therapeutic Activity:  [x]  See flow sheet :   Rationale: increase ROM, increase strength, improve coordination and increase proprioception  to improve the patients ability to perform daily activities with decreased pain and symptom levels       7 min Neuromuscular Re-education:  [x]  See flow sheet :   Rationale: increase ROM, increase strength, improve coordination and increase proprioception  to improve the patients ability to perform daily activities with decreased pain and symptom levels      30 min Manual Therapy:  SALUD AIC correction, Sternal mobilization with active pelvic tilt, Superior T4 (SALUD technique), Right subclavius release (SALUD technique), SALUD infraclavicular rib pump with active breath   Manual stretching of left posterior hip and right QL in left S/L  STM to bilateral adductors in 90-90  Obtained consent for hand placement        Rationale: decrease pain, increase ROM, increase tissue extensibility, decrease trigger points and increase postural awareness to improve the patients ability to perform daily activities with decreased pain and symptom levels    The manual therapy interventions were performed at a separate and distinct time from the therapeutic activities interventions. With   [] TE   [] TA   [] neuro   [] other: Patient Education: [x] Review HEP    [] Progressed/Changed HEP based on:   [] positioning   [] body mechanics   [] transfers   [] heat/ice application    [] other:      Other Objective/Functional Measures:   SALUD Special Tests (pre/post)  HGIR:                                                 Right: 90             Left: 90  Hip Add Drop Test:                           Right: -              Left:-  Trunk Rot                                            Right: 15 in/14in Left 14.5 in /14 in  Shoulder Horizontal Abduction           Right: 45              Left: 36 /NT     Hip Add Lift (scored 0-5)  Right: 3/3  Left: 3/3  FA ER     Right: 28  Left: 25  FA IR     Right: 35  Left: 35         Pain Level (0-10 scale) post treatment: 0-1    ASSESSMENT/Changes in Function:   Overall pt is doing very well. Presented as SALUD neutral and is staying consistent at a 3/5 on adduction lift test. Challenged with quadruped glut max and reciprocal left stance. Added standing supported IO/TA as an option for LBP relief during the day. Patient will continue to benefit from skilled PT services to modify and progress therapeutic interventions, address functional mobility deficits, address ROM deficits, address strength deficits, analyze and address soft tissue restrictions, analyze and cue movement patterns, analyze and modify body mechanics/ergonomics, assess and modify postural abnormalities and instruct in home and community integration to attain remaining goals.      [x]  See Plan of Care  []  See progress note/recertification  []  See Discharge Summary         Progress towards goals / Updated goals:  Long Term Goals: LTG- To be accomplished in 14 treatment(s):  1.  Pt will improve hip adduction drop to negative to indicate improved femoral-acetabular mobility needed for gait. .  Eval:positive bilaterally   Last PN: ; Right -; left - :MET 11/4/21        2.  Pt will improve hip adduction lift test to 4/5 bilaterally to indicate proper pelvic position needed for walking .  Eval:0/5 bilaterally  Last PN:Maintaining progress 3/3/22 3/5 bilaterally      3.  Pt will improve SALUD squat to 3/5 or better to indicate LE, pelvic, hip and back mobility needed for daily activities  Eval:SALUD squat 1/5  Last PN :3/5*  Pt was able to squat to parallel with good rib cage. Possible 3/5 with some reports of lateral hip tension: progressing 12/27/21     4.  Pt FOTO score will increase by 10 points to show improvement in function. 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  Pt will be able to return to or start an exercise program without reoccurrence of pain to allow to return to healthy lifestyle.   Last PN:Progressing 3/8/22 proved compliant with all 3-4 strengthening exercises, plan to progress next session     PLAN  []  Upgrade activities as tolerated     [x]  Continue plan of care  []  Update interventions per flow sheet       []  Discharge due to:_  []  Other:_      Magdy Banda, PT, DPT 3/14/2022  1:37 PM    Future Appointments   Date Time Provider [place]   3/29/2022  5:15 PM Beverley Bang, PT, DPT [facility]   5/11/2022  1:20 PM Jhon Reynolds[address]

## 2022-03-15 ENCOUNTER — APPOINTMENT (OUTPATIENT)
Dept: PHYSICAL THERAPY | Age: 32
End: 2022-03-15
Payer: COMMERCIAL

## 2022-03-29 ENCOUNTER — HOSPITAL ENCOUNTER (OUTPATIENT)
Dept: PHYSICAL THERAPY | Age: 32
Discharge: HOME | End: 2022-03-29
Payer: COMMERCIAL

## 2022-03-29 PROCEDURE — 97112 NEUROMUSCULAR REEDUCATION: CPT

## 2022-03-29 PROCEDURE — 97530 THERAPEUTIC ACTIVITIES: CPT

## 2022-03-29 NOTE — PROGRESS NOTES
PT DAILY TREATMENT NOTE    Patient Name: Jian Fonseca  Date:3/29/2022  : 1990  [x]  Patient  Verified  Payor: Joce Moore / Plan: Dre Mcnair [id number] PPO / Product Type: PPO /    In time:5:21 pm  Out time:6:15 pm   Total Treatment Time (min): 54  Total Timed Codes (min): 54  Visit #: 35 of 40    Treatment Dx: Other low back pain [M54.59]  SI (sacroiliac) joint dysfunction [M53.3]    SUBJECTIVE  Pain Level (0-10 scale):  2  Any medication changes, allergies to medications, adverse drug reactions, diagnosis change, or new procedure performed?: [x] No    [] Yes (see summary sheet for update)  Subjective functional status/changes:   [] No changes reported  Pt just returned from getting  and     OBJECTIVE    24 min Therapeutic Activity:  [x]  See flow sheet : added lunges to HEP 2 sets of 5 reps    Rationale: increase ROM, increase strength, improve coordination and increase proprioception  to improve the patients ability to perform daily activities with decreased pain and symptom levels       30 min Neuromuscular Re-education:  [x]  See flow sheet :   Rationale: increase ROM, increase strength, improve coordination and increase proprioception  to improve the patients ability to perform daily activities with decreased pain and symptom levels            With   [] TE   [] TA   [] neuro   [] other: Patient Education: [x] Review HEP    [] Progressed/Changed HEP based on:   [] positioning   [] body mechanics   [] transfers   [] heat/ice application    [] other:      Other Objective/Functional Measures:   SALUD Special Tests (pre/post)  HGIR:                                                 Right: 90             Left: 90  Hip Add Drop Test:                           Right: -              Left:-  Trunk Rot                                            Right: 15 in/14 in   Left 14 in /14 in  Shoulder Horizontal Abduction           Right: 45              Left: 32 /NT     Hip Add Lift (scored 0-5)  Right: 1/2  Left: 2/2  FA ER     Right: 25  Left: 31  FA IR     Right: 30  Left: 30         Pain Level (0-10 scale) post treatment: 0-1    ASSESSMENT/Changes in Function:   Pt has not been seen in 2 weeks due to being out of town for wedding. Pt reported that tolerated wedding well. No flare ups not pelvic pain. Has increase in lumbar soreness. Pt has goal of being able to return to exercise without pain and fear of hurting herself or pelvic pain returning. Was able to update warm up and repositioning exercises and add strengthening exercises of lunges. Plan to continue 1 time per week as work in to regular exercise program.     Patient will continue to benefit from skilled PT services to modify and progress therapeutic interventions, address functional mobility deficits, address ROM deficits, address strength deficits, analyze and address soft tissue restrictions, analyze and cue movement patterns, analyze and modify body mechanics/ergonomics, assess and modify postural abnormalities and instruct in home and community integration to attain remaining goals. [x]  See Plan of Care  []  See progress note/recertification  []  See Discharge Summary         Progress towards goals / Updated goals:  Long Term Goals: LTG- To be accomplished in 14 treatment(s):  1.  Pt will improve hip adduction drop to negative to indicate improved femoral-acetabular mobility needed for gait. .  Eval:positive bilaterally   Last PN: ; Right -; left - :MET 11/4/21        2.  Pt will improve hip adduction lift test to 4/5 bilaterally to indicate proper pelvic position needed for walking .  Eval:0/5 bilaterally  Last PN:Maintaining progress 3/3/22 3/5 bilaterally       3.  Pt will improve SALUD squat to 3/5 or better to indicate LE, pelvic, hip and back mobility needed for daily activities  Eval:SALUD squat 1/5  Last PN :3/5*  Pt was able to squat to parallel with good rib cage.  Possible 3/5 with some reports of lateral hip tension: progressing 12/27/21  Current: MET 3/5 3/29/22     4.  Pt FOTO score will increase by 10 points to show improvement in function. 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  Pt will be able to return to or start an exercise program without reoccurrence of pain to allow to return to healthy lifestyle.   Last PN:Progressing 3/8/22 proved compliant with all 3-4 strengthening exercises, plan to progress next session   Current: 3/29/22 - will return to walking program this week - was out of town for wedding    PLAN  []  Upgrade activities as tolerated     [x]  Continue plan of care  []  Update interventions per flow sheet       []  Discharge due to:_  []  Other:_      Onofre Marin PT, DPT 3/29/2022  5:25 PM    Future Appointments   Date Time Provider [place]   4/5/2022  5:15 PM Yaya Laughter, PT, DPT [facility]   4/12/2022  5:15 PM [facility]   4/19/2022  5:15 PM Yaya Laughter, PT, DPT [facility]   4/26/2022  5:15 PM Yaya Laughter, PT, DPT [facility]   5/11/2022  1:20 PM Skyler Cantu[address]

## 2022-03-29 NOTE — PROGRESS NOTES
[facility] at the [address], [place] Kevin blair, [address] [facility]  Phone: [phone]      Fax:  [phone]    Progress Note  Patient name: Taylor Muñiz Start of Care: 2021   Referral source: Barr, Madeleine Goldberg, MD : 1990               Medical Diagnosis: Low back pain [M54.5]  Sacroiliac joint pain [M53.3]    Onset Date:3 years ago2               Treatment Diagnosis: Lumbopelvic dysfunction    Prior Hospitalization: see medical history Provider#: [id number]   Medications: Verified on Patient summary List    Comorbidities: hx of chronic bladder infection   Prior Level of Function: Increase in pain 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, 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- To be accomplished in 14 treatment(s):  1.  Pt will improve hip adduction drop to negative to indicate improved femoral-acetabular mobility needed for gait. .  Eval:positive bilaterally   Last PN: ; Right -; left - :MET 21        2.  Pt will improve hip adduction lift test to 4/5 bilaterally to indicate proper pelvic position needed for walking .  Eval:0/5 bilaterally  Last PN:Maintaining progress 3/3/22 3/5 bilaterally       3.  Pt will improve SALUD squat to 3/5 or better to indicate LE, pelvic, hip and back mobility needed for daily activities  Eval:SALUD squat 1/5  Last PN :3/5*  Pt was able to squat to parallel with good rib cage. Possible 3/5 with some reports of lateral hip tension: progressing 21  Current: MET 3/5 3/29/22     4.  Pt FOTO score will increase by 10 points to show improvement in function. 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 Pt will be able to return to or start an exercise program without reoccurrence of pain to allow to return to healthy lifestyle.   Last PN:Progressing 3/8/22 proved compliant with all 3-4 strengthening exercises, plan to progress next session   Current: 3/29/22 - will return to walking program this week - was out of town for wedding    Key Functional Changes:   SALUD Special Tests (pre/post)  HGIR:                                                  Right: 90                      Left: 90  Hip Add Drop Test:                             Right: -                                    Left:-  Trunk Rot                                           Right: 15 in/14 in           Left 14 in /14 in  Shoulder Horizontal Abduction           Right: 45                       Left: 32 /NT     Hip Add Lift (scored 0-5)                     Right: 1/2                     Left: 2/2  FA ER                                                  Right: 25                      Left: 31  FA IR                                                   Right: 30                      Left: 30                            Pt has not been seen in 2 weeks due to being out of town for wedding. Pt reported that tolerated wedding well. No flare ups not pelvic pain. Has increase in lumbar soreness. Pt has goal of being able to return to exercise without pain and fear of hurting herself or pelvic pain returning. Was able to update warm up and repositioning exercises and add strengthening exercises of lunges. Plan to continue 1 time per week as work in to regular exercise program.     Updated Goals: to be achieved in 5 treatments:  1. Pt will score 4/5 on adduction lift test bilaterally to indicate ability to load each LE in stance phase of gait.      ASSESSMENT/RECOMMENDATIONS:  [x]Continue therapy per initial plan/protocol at a frequency of  5 treatments  []Continue therapy with the following recommended changes:_____________________      _____________________________________________________________________  []Discontinue therapy progressing towards or have reached established goals  []Discontinue therapy due to lack of appreciable progress towards goals  []Discontinue therapy due to lack of attendance or compliance  []Await Physician's recommendations/decisions regarding therapy  []Other:________________________________________________________________    Thank you for this referral.   Jose Vergara, PT, DPT 3/29/2022 7:20 PM

## 2022-04-05 ENCOUNTER — HOSPITAL ENCOUNTER (OUTPATIENT)
Dept: PHYSICAL THERAPY | Age: 32
Discharge: HOME | End: 2022-04-05
Payer: COMMERCIAL

## 2022-04-05 PROCEDURE — 97112 NEUROMUSCULAR REEDUCATION: CPT

## 2022-04-05 PROCEDURE — 97530 THERAPEUTIC ACTIVITIES: CPT

## 2022-04-05 NOTE — PROGRESS NOTES
PT DAILY TREATMENT NOTE    Patient Name: Washington Cardenas  Date:2022  : 1990  [x]  Patient  Verified  Payor: Sherri Mediate / Plan: Dre Mcnair [id number] PPO / Product Type: PPO /    In time:5:15 pm  Out time:6:10 pm  Total Treatment Time (min): 55  Total Timed Codes (min): 55  1:1 Treatment Time (MC/BCBS only): 45   Visit #: 39 of 40    Treatment Dx: Other low back pain [M54.59]  SI (sacroiliac) joint dysfunction [M53.3]    SUBJECTIVE  Pain Level (0-10 scale): 2  Any medication changes, allergies to medications, adverse drug reactions, diagnosis change, or new procedure performed?: [x] No    [] Yes (see summary sheet for update)  Subjective functional status/changes:   [] No changes reported  \"I am gonna be honest I confused myself with the lunges. \"    OBJECTIVE    15 min Therapeutic Activity:  [x]  See flow sheet :   Rationale: increase ROM, increase strength, improve coordination and increase proprioception  to improve the patients ability to perform daily activities with decreased pain and symptom levels       40 min Neuromuscular Re-education:  [x]  See flow sheet :   Rationale: increase ROM, increase strength, improve coordination and increase proprioception  to improve the patients ability to perform daily activities with decreased pain and symptom levels            With   [] TE   [] TA   [] neuro   [] other: Patient Education: [x] Review HEP    [] Progressed/Changed HEP based on:   [] positioning   [] body mechanics   [] transfers   [] heat/ice application    [] other:      Other Objective/Functional Measures:   SALUD Special Tests (pre/post)  HGIR:                                                 Right: 80/90             Left: 90  Hip Add Drop Test:                           Right: -              Left:-  Pelvic [place] Drop  Right:midline  Left: Midline  Trunk Rot                                            Right: 15 in    Left 15 in    Shoulder Horizontal Abduction           Right: 45 Nae Mancuso Left: 35 /NT     Hip Add Lift (scored 0-5)  Right: 2-3/3  Left: 2-3/3       Pain Level (0-10 scale) post treatment: 0    ASSESSMENT/Changes in Function:   Pt tolerated session well. Worked on more integrative exercises. Added retro walking and reviewed with pt as active warm-up. Also added down dog squats as warm-up. Pt was fatigued with lunges - responded well to split stance RDL. Patient will continue to benefit from skilled PT services to modify and progress therapeutic interventions, address functional mobility deficits, address ROM deficits, address strength deficits, analyze and address soft tissue restrictions, analyze and cue movement patterns, analyze and modify body mechanics/ergonomics, assess and modify postural abnormalities and instruct in home and community integration to attain remaining goals. [x]  See Plan of Care  []  See progress note/recertification  []  See Discharge Summary         Progress towards goals / Updated goals:  Long Term Goals: LTG- To be accomplished in 14 treatment(s):  1.  Pt will improve hip adduction drop to negative to indicate improved femoral-acetabular mobility needed for gait. .  Eval:positive bilaterally   Last PN: ; Right -; left - :MET 11/4/21        2.  Pt will improve hip adduction lift test to 4/5 bilaterally to indicate proper pelvic position needed for walking .  Eval:0/5 bilaterally  Last PN:Maintaining progress 3/3/22 3/5 bilaterally       3.  Pt will improve SALUD squat to 3/5 or better to indicate LE, pelvic, hip and back mobility needed for daily activities  Eval:SALUD squat 1/5  Last PN MET 3/5 3/29/22     4.  Pt FOTO score will increase by 10 points to show improvement in function. 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  Pt will be able to return to or start an exercise program without reoccurrence of pain to allow to return to healthy lifestyle.   Last PN: 3/29/22 - will return to walking program this week - was out of town for wedding  Current: progressing 4/5/22 reviewed warm up and progressions      PLAN  []  Upgrade activities as tolerated     [x]  Continue plan of care  []  Update interventions per flow sheet       []  Discharge due to:_  []  Other:_      Kyree Chopra PT, DPT 4/5/2022  5:30 PM    Future Appointments   Date Time Provider [place]   4/12/2022  5:15 PM Sergei Diggs PT, DPT [facility]   4/19/2022  5:15 PM Sergei Diggs PT, DPT [facility]   4/26/2022  5:15 PM Sergei Diggs PT, DPT [facility]   5/11/2022  1:20 PM Yessica Hsu[address]

## 2022-04-12 ENCOUNTER — APPOINTMENT (OUTPATIENT)
Dept: PHYSICAL THERAPY | Age: 32
End: 2022-04-12
Payer: COMMERCIAL

## 2022-04-19 ENCOUNTER — HOSPITAL ENCOUNTER (OUTPATIENT)
Dept: PHYSICAL THERAPY | Age: 32
Discharge: HOME | End: 2022-04-19
Payer: COMMERCIAL

## 2022-04-19 PROCEDURE — 97530 THERAPEUTIC ACTIVITIES: CPT

## 2022-04-19 PROCEDURE — 97112 NEUROMUSCULAR REEDUCATION: CPT

## 2022-04-19 NOTE — PROGRESS NOTES
PT DAILY TREATMENT NOTE    Patient Name: Kelley Arias  Date:2022  : 1990  [x]  Patient  Verified  Payor: BLUE CROSS / Plan: [facility] PPO / Product Type: PPO /    In time:5:19 pm  Out time:6:09 pm   Total Treatment Time (min): 50  Total Timed Codes (min): 50  1:1 Treatment Time (MC/BCBS only): 50   Visit #: 40 of 40    Treatment Dx: Other low back pain [M54.59]  SI (sacroiliac) joint dysfunction [M53.3]    SUBJECTIVE  Pain Level (0-10 scale): 2  Any medication changes, allergies to medications, adverse drug reactions, diagnosis change, or new procedure performed?: [x] No    [] Yes (see summary sheet for update)  Subjective functional status/changes:   [] No changes reported  Pt reported that has been on honeymoon. Unable to complete regular exercises but had minimal pain while on trip . Spent night in airport on way home and had stiffness with getting off plane. Had pain on Monday but was able to decrease with repositioning exercises.      OBJECTIVE    30 min Therapeutic Activity:  [x]  See flow sheet : reviewed HEP,strengthening exercises    Rationale: increase ROM, increase strength, improve coordination and increase proprioception  to improve the patients ability to perform daily activities with decreased pain and symptom levels       20 min Neuromuscular Re-education:  [x]  See flow sheet :   Rationale: increase ROM, increase strength, improve coordination and increase proprioception  to improve the patients ability to perform daily activities with decreased pain and symptom levels          With   [] TE   [] TA   [] neuro   [] other: Patient Education: [x] Review HEP    [] Progressed/Changed HEP based on:   [] positioning   [] body mechanics   [] transfers   [] heat/ice application    [] other:      Other Objective/Functional Measures:   SALUD Special Tests (pre/post)  HGIR:                                                 Right: 90             Left: 90  Hip Add Drop Test: Right: -             Left:-  Pelvic [place] Drop  Right: -   Left: midline  Trunk Rot                                            Right: 15 in  Left 15 in   Shoulder Horizontal Abduction           Right: WFL              Left: 40      Hip Add Lift (scored 0-5)  Right: 3/3  Left: 2-3/3  FA ER     Right: 33  Left: 40  FA IR     Right: 34  Left: 31         Pain Level (0-10 scale) post treatment: 0    ASSESSMENT/Changes in Function:   Pt tolerated session well. Has demonstrated excellent ability to maintain neutral with HEP and self manage sx. Reported no pain with walking over vacation and primarily stiffness after sleeping in airport and being on plane. Was able to manage mild increase in pain on Monday with repositioning of 90-90,90-90 hip shift and glut max. Reviewed warm up for exercises and split stance RDL - had to modify due to mild left sacral pulling. Patient will continue to benefit from skilled PT services to modify and progress therapeutic interventions, address functional mobility deficits, address ROM deficits, address strength deficits, analyze and address soft tissue restrictions, analyze and cue movement patterns, analyze and modify body mechanics/ergonomics, assess and modify postural abnormalities and instruct in home and community integration to attain remaining goals.      [x]  See Plan of Care  []  See progress note/recertification  []  See Discharge Summary         Progress towards goals / Updated goals:  Long Term Goals: LTG- To be accomplished in 14 treatment(s):  1.  Pt will improve hip adduction drop to negative to indicate improved femoral-acetabular mobility needed for gait. .  Eval:positive bilaterally   Last PN: ; Right -; left - :MET 11/4/21        2.  Pt will improve hip adduction lift test to 4/5 bilaterally to indicate proper pelvic position needed for walking .  Eval:0/5 bilaterally  Last PN:Maintaining progress 3/3/22 3/5 bilaterally       3.  Pt will improve SALUD squat to 3/5 or better to indicate LE, pelvic, hip and back mobility needed for daily activities  Eval:SALUD squat 1/5  Last PN MET 3/5 3/29/22     4.  Pt FOTO score will increase by 10 points to show improvement in function. 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  Pt will be able to return to or start an exercise program without reoccurrence of pain to allow to return to healthy lifestyle.   Last PN: 3/29/22 - will return to walking program this week - was out of town for wedding  Current: progressing 4/19/22 reviewed warm up and progressions      PLAN  []  Upgrade activities as tolerated     [x]  Continue plan of care  []  Update interventions per flow sheet       []  Discharge due to:_  []  Other:_      Chadwick Pandya, PT, DPT 4/19/2022  5:23 PM    Future Appointments   Date Time Provider [place]   4/26/2022  5:15 PM Norman Diaz, PT, DPT [facility]   5/11/2022  1:20 PM Gina Prince, [address]

## 2022-04-26 ENCOUNTER — APPOINTMENT (OUTPATIENT)
Dept: PHYSICAL THERAPY | Age: 32
End: 2022-04-26
Payer: COMMERCIAL

## 2022-05-16 ENCOUNTER — TELEPHONE (OUTPATIENT)
Dept: PHYSICAL THERAPY | Age: 32
End: 2022-05-16

## 2022-05-17 ENCOUNTER — TELEPHONE (OUTPATIENT)
Dept: PHYSICAL THERAPY | Age: 32
End: 2022-05-17

## 2022-05-25 ENCOUNTER — HOSPITAL ENCOUNTER (OUTPATIENT)
Dept: PHYSICAL THERAPY | Age: 32
Discharge: HOME | End: 2022-05-25
Payer: COMMERCIAL

## 2022-05-25 PROCEDURE — 97112 NEUROMUSCULAR REEDUCATION: CPT

## 2022-05-25 PROCEDURE — 97530 THERAPEUTIC ACTIVITIES: CPT

## 2022-05-25 PROCEDURE — 97140 MANUAL THERAPY 1/> REGIONS: CPT

## 2022-05-25 NOTE — PROGRESS NOTES
[facility] at the [address], [place] Kevin blair, [address] 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- To be accomplished in 14 treatment(s):  1.  Pt will improve hip adduction drop to negative to indicate improved femoral-acetabular mobility needed for gait. .  Eval:positive bilaterally   Last PN: ; Right -; left - :MET 21        2.  Pt will improve hip adduction lift test to 4/5 bilaterally to indicate proper pelvic position needed for walking .  Eval:0/5 bilaterally  Last PN:Maintaining progress 3/3/22 3/5 bilaterally   Current: 22 maintaining progress at 3/5 bilaterally       3.  Pt will improve SALUD squat to 3/5 or better to indicate LE, pelvic, hip and back mobility needed for daily activities  Eval:SALUD squat 1/5  Last PN MET 3/5 3/29/22  Current: MET 22 3/5     4.  Pt FOTO score will increase by 10 points to show improvement in function. 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       1. Pt will be able to return to or start an exercise program without reoccurrence of pain to allow to return to healthy lifestyle.   Last PN: 3/29/22 - will return to walking program this week - was out of town for wedding  Current: progressing 5/25/22 reviewed warm up and progressions      Key Functional Changes:   Pt has not been seen in >1 month due in part to provider being out unexpectedly at time of scheduled appointment. Since last visit and overall pt is progressing very nicely. Has had episodes of LBP only mild pelvic pain. Associated around time had stomach bug. Pt has been compliant with HEP and returned back to strengthening program. Reviewed and updated home program - strengthening program and repositioning exercises. Have focussed most recently on left ab wall facilitation. Plan to follow-up in 2 weeks as pt is getting ready to move to [place].  Will maximize remaining sessions to transition to independent program     Updated Goals: to be achieved in 2 treatments:   Same as remaining unmet goals     ASSESSMENT/RECOMMENDATIONS:  [x]Continue therapy per initial plan/protocol at a frequency of  2 treatments  []Continue therapy with the following recommended changes:_____________________      _____________________________________________________________________  []Discontinue therapy progressing towards or have reached established goals  []Discontinue therapy due to lack of appreciable progress towards goals  []Discontinue therapy due to lack of attendance or compliance  []Await Physician's recommendations/decisions regarding therapy  []Other:________________________________________________________________    Thank you for this referral.   Norah Gilford, PT, DPT 5/25/2022 9:16 AM

## 2022-05-25 NOTE — PROGRESS NOTES
PT DAILY TREATMENT NOTE    Patient Name: Yudelka Whittington  Date:2022  : 1990  [x]  Patient  Verified  Payor: Jackie Salas / Plan: Dre Mcnair [id number] PPO / Product Type: PPO /    In time:7:45 am  Out time:8:50 am  Total Treatment Time (min): 65  Total Timed Codes (min): 65  1:1 Treatment Time (MC/BCBS only): 65   Visit #: 45 of 40    Treatment Dx: Other low back pain [M54.59]  SI (sacroiliac) joint dysfunction [M53.3]    SUBJECTIVE  Pain Level (0-10 scale): 0  Any medication changes, allergies to medications, adverse drug reactions, diagnosis change, or new procedure performed?: [x] No    [] Yes (see summary sheet for update)  Subjective functional status/changes:   [] No changes reported  Pt has not been seen in 1 month due to provider being out sick at time of last scheduled appointment. Since last session pt reports having stomach flu/bug and following had a fare up of pain that lasted 5 days. Was able to manage with HEP  Reported that has been exercising regularly but SL RDLs are not working.      OBJECTIVE    30 min Therapeutic Activity:  [x]  See flow sheet : review of HEP - updated hand outs   Discussed different ways to manage sx as they arise - pelvic flares, back pain etc    Rationale: increase ROM, increase strength, improve coordination and increase proprioception  to improve the patients ability to perform daily activities with decreased pain and symptom levels       25 min Neuromuscular Re-education:  [x]  See flow sheet :   Rationale: increase ROM, increase strength, improve coordination and increase proprioception  to improve the patients ability to perform daily activities with decreased pain and symptom levels      10 min Manual Therapy:  SALUD AIC correction, Sternal mobilization with active pelvic tilt, Superior T4 (SALUD technique), Right subclavius release (SALUD technique), SALUD infraclavicular rib pump with active breath   Obtained consent for hand placement      Rationale: decrease pain, increase ROM, increase tissue extensibility, decrease trigger points and increase postural awareness to improve the patients ability to perform daily activities with decreased pain and symptom levels    The manual therapy interventions were performed at a separate and distinct time from the therapeutic activities interventions. With   [] TE   [] TA   [] neuro   [] other: Patient Education: [x] Review HEP    [] Progressed/Changed HEP based on:   [] positioning   [] body mechanics   [] transfers   [] heat/ice application    [] other:      Other Objective/Functional Measures:   SALUD Special Tests (pre/post)  HGIR:                                                 Right: 78/90             Left: 90/90  Hip Add Drop Test:                           Right: -/-            Left:-/-  Pelvic [place] Drop  Right: midline  Left: midline  Trunk Rot                                            Right: 15 in  Left 15 in    Shoulder Horizontal Abduction           Right: 45 /nT             Left: 34 /NT     Apical Ext Test:                                   Right: dcr/improved    Left: dcr/improved   Hip Add Lift (scored 0-5)  Right: 2/3  Left: 2/3  FA ER     Right: 30  Left: 31  FA IR     Right: 29  Left: 25         Pain Level (0-10 scale) post treatment: 0    ASSESSMENT/Changes in Function:   Pt has not been seen in >1 month due in part to provider being out unexpectedly at time of scheduled appointment. Since last visit and overall pt is progressing very nicely. Has had episodes of LBP only mild pelvic pain. Associated around time had stomach bug. Pt has been compliant with HEP and returned back to strengthening program. Reviewed and updated home program - strengthening program and repositioning exercises. Have focussed most recently on left ab wall facilitation. Plan to follow-up in 2 weeks as pt is getting ready to move to [place].  Will maximize remaining sessions to transition to independent program Patient will continue to benefit from skilled PT services to modify and progress therapeutic interventions, address functional mobility deficits, address ROM deficits, address strength deficits, analyze and address soft tissue restrictions, analyze and cue movement patterns, analyze and modify body mechanics/ergonomics, assess and modify postural abnormalities and instruct in home and community integration to attain remaining goals. [x]  See Plan of Care  []  See progress note/recertification  []  See Discharge Summary         Progress towards goals / Updated goals:  Long Term Goals: LTG- To be accomplished in 14 treatment(s):  1.  Pt will improve hip adduction drop to negative to indicate improved femoral-acetabular mobility needed for gait. .  Eval:positive bilaterally   Last PN: ; Right -; left - :MET 11/4/21        2.  Pt will improve hip adduction lift test to 4/5 bilaterally to indicate proper pelvic position needed for walking .  Eval:0/5 bilaterally  Last PN:Maintaining progress 3/3/22 3/5 bilaterally   Current: 5/25/22 maintaining progress at 3/5 bilaterally       3.  Pt will improve SALUD squat to 3/5 or better to indicate LE, pelvic, hip and back mobility needed for daily activities  Eval:SALUD squat 1/5  Last PN MET 3/5 3/29/22  Current: MET 5/25/22 3/5     4.  Pt FOTO score will increase by 10 points to show improvement in function. 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       1.  Pt will be able to return to or start an exercise program without reoccurrence of pain to allow to return to healthy lifestyle.   Last PN: 3/29/22 - will return to walking program this week - was out of town for wedding  Current: progressing 5/25/22 reviewed warm up and progressions      PLAN  []  Upgrade activities as tolerated     [x]  Continue plan of care  []  Update interventions per flow sheet       []  Discharge due to:_  []  Other:_      Catherine Lane, PT, DPT 5/25/2022  7:48 AM    Future Appointments   Date Time Provider [place]   5/26/2022  3:00 PM DIANE Dinh   6/9/2022  3:45 PM Lakia Cole PT, SHARRI RODRIGUEZ [facility]   6/21/2022  5:15 PM Lakia Cole PT, SHARRI RODRIGUEZ [facility]   9/12/2022  7:00 AM DIANE Cormier

## 2022-06-07 ENCOUNTER — APPOINTMENT (OUTPATIENT)
Dept: PHYSICAL THERAPY | Age: 32
End: 2022-06-07
Payer: COMMERCIAL

## 2022-06-09 ENCOUNTER — HOSPITAL ENCOUNTER (OUTPATIENT)
Dept: PHYSICAL THERAPY | Age: 32
Discharge: HOME | End: 2022-06-09
Payer: COMMERCIAL

## 2022-06-09 PROCEDURE — 97140 MANUAL THERAPY 1/> REGIONS: CPT

## 2022-06-09 PROCEDURE — 97112 NEUROMUSCULAR REEDUCATION: CPT

## 2022-06-09 PROCEDURE — 97530 THERAPEUTIC ACTIVITIES: CPT

## 2022-06-09 NOTE — PROGRESS NOTES
PT DAILY TREATMENT NOTE    Patient Name: Mark Kaplan  Date:2022  : 1990  [x]  Patient  Verified  Payor: Óscar Saleh / Plan: Dre Mcnair [id number] PPO / Product Type: PPO /    In time:3:55 pm  Out time:4:55 pm  Total Treatment Time (min): 60  Total Timed Codes (min): 60  1:1 Treatment Time (MC/BCBS only): 60   Visit #: 44 of 40    Treatment Dx: Other low back pain [M54.59]  SI (sacroiliac) joint dysfunction [M53.3]    SUBJECTIVE  Pain Level (0-10 scale): 0  Any medication changes, allergies to medications, adverse drug reactions, diagnosis change, or new procedure performed?: [x] No    [] Yes (see summary sheet for update)  Subjective functional status/changes:   [] No changes reported  \"I am not gonna lie I have been bad about my exercises. But I have been good. \"    OBJECTIVE    20 min Therapeutic Activity:  [x]  See flow sheet :   Rationale: increase ROM, increase strength, improve coordination and increase proprioception  to improve the patients ability to perform daily activities with decreased pain and symptom levels       32 min Neuromuscular Re-education:  [x]  See flow sheet :vacuum cupping to thoracic and lumbar paraspinals in child's pose   Rationale: increase ROM, increase strength, improve coordination and increase proprioception  to improve the patients ability to perform daily activities with decreased pain and symptom levels      8 min Manual Therapy:  SALUD AIC correction, Sternal mobilization with active pelvic tilt, Superior T4 (SALUD technique),SALUD infraclavicular rib pump with active breath   Obtained consent for hand placement      Rationale: decrease pain, increase ROM, increase tissue extensibility, decrease trigger points and increase postural awareness to improve the patients ability to perform daily activities with decreased pain and symptom levels    The manual therapy interventions were performed at a separate and distinct time from the therapeutic activities interventions. With   [] TE   [] TA   [] neuro   [] other: Patient Education: [x] Review HEP    [] Progressed/Changed HEP based on:   [] positioning   [] body mechanics   [] transfers   [] heat/ice application    [] other:      Other Objective/Functional Measures:   SALUD Special Tests (pre/post)  HGIR:                                                 Right: 75/90             Left: 90  Hip Add Drop Test:                           Right: -              Left:-  Pelvic Kendall Drop  Right: +  Left: +  Trunk Rot                                            Right: 14.5 in/14 in Left 14 in /14 in   Shoulder Horizontal Abduction           Right: 45 /45             Left: 40 /45     Apical Ext Test:                                   Right: dcr             Left: dcr  Hip Add Lift (scored 0-5)  Right: 1-2/2-3  Left: 1-2/3  FA ER     Right: 35  Left: 40  FA IR     Right: 34  Left: 35         Pain Level (0-10 scale) post treatment: 0    ASSESSMENT/Changes in Function:   Pt reports decreased compliance with HEP since last session due to work and other time constraints. Despite lack of exercise pt reports minimal pain since last session. 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 Plan to D/C next session as pt is moving to [place]. Patient will continue to benefit from skilled PT services to modify and progress therapeutic interventions, address functional mobility deficits, address ROM deficits, address strength deficits, analyze and address soft tissue restrictions, analyze and cue movement patterns, analyze and modify body mechanics/ergonomics, assess and modify postural abnormalities and instruct in home and community integration to attain remaining goals.      [x]  See Plan of Care  []  See progress note/recertification  []  See Discharge Summary         Progress towards goals / Updated goals:  Long Term Goals: LTG- To be accomplished in 14 treatment(s):  1.  Pt will improve hip adduction drop to negative to indicate improved femoral-acetabular mobility needed for gait. .  Eval:positive bilaterally   Last PN: ; Right -; left - :MET 11/4/21        2.  Pt will improve hip adduction lift test to 4/5 bilaterally to indicate proper pelvic position needed for walking .  Eval:0/5 bilaterally  Last PN: 5/25/22 maintaining progress at 3/5 bilaterally    Current: 6/9/22 maintaining above progress    3.  Pt will improve SALUD squat to 3/5 or better to indicate LE, pelvic, hip and back mobility needed for daily activities  Eval:SALUD squat 1/5  Last PN MET 5/25/22 3/5     4.  Pt FOTO score will increase by 10 points to show improvement in function. 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       1.  Pt will be able to return to or start an exercise program without reoccurrence of pain to allow to return to healthy lifestyle.   Last PN[de-identified] progressing 5/25/22 reviewed warm up and progressions    Current: 6/9/22 reviewed HEP    PLAN  []  Upgrade activities as tolerated     [x]  Continue plan of care  []  Update interventions per flow sheet       []  Discharge due to:_  []  Other:_      Pj March, PT, DPT 6/9/2022  2:39 PM    Future Appointments   Date Time Provider [place] Newberry   6/9/2022  3:45 PM Max Decree, PT, DPT [facility]   6/21/2022  5:15 PM Max Decree, PT, DPT [facility]   9/12/2022  7:00 AM Marleny Haines, [address] [place] [address]

## 2022-06-21 ENCOUNTER — HOSPITAL ENCOUNTER (OUTPATIENT)
Dept: PHYSICAL THERAPY | Age: 32
Discharge: HOME | End: 2022-06-21
Payer: COMMERCIAL

## 2022-06-21 PROCEDURE — 97530 THERAPEUTIC ACTIVITIES: CPT

## 2022-06-21 PROCEDURE — 97112 NEUROMUSCULAR REEDUCATION: CPT

## 2022-06-21 NOTE — PROGRESS NOTES
PT DAILY TREATMENT NOTE    Patient Name: Calixto Chacko  Date:2022  : 1990  [x]  Patient  Verified  Payor: BLUE CROSS / Plan: Der Mcnair [id number] PPO / Product Type: PPO /    In time:5:25 pm  Out time:6:20 pm   Total Treatment Time (min): 55  Total Timed Codes (min): 55  1:1 Treatment Time (MC/BCBS only): 55   Visit #: 40 of 40    Treatment Dx: Other low back pain [M54.59]  SI (sacroiliac) joint dysfunction [M53.3]    SUBJECTIVE  Pain Level (0-10 scale): 0  Any medication changes, allergies to medications, adverse drug reactions, diagnosis change, or new procedure performed?: [x] No    [] Yes (see summary sheet for update)  Subjective functional status/changes:   [] No changes reported  \"Sore more sore than anything. I just drove back from [facility]. \"    OBJECTIVE    25 min Therapeutic Activity:  [x]  See flow sheet :   Rationale: increase ROM, increase strength, improve coordination and increase proprioception  to improve the patients ability to perform daily activities with decreased pain and symptom levels       30 min Neuromuscular Re-education:  [x]  See flow sheet :vacuum cupping to thoracic and lumbar paraspinals in child's pose   Rationale: increase ROM, increase strength, improve coordination and increase proprioception  to improve the patients ability to perform daily activities with decreased pain and symptom levels            With   [] TE   [x] TA   [] neuro   [] other: Patient Education: [x] Review HEP  - reviewed repositioning for left sided SI pain - bobby pose, 90-90,90-90 hip shift, adductor pull back, crossover glut max   [] Progressed/Changed HEP based on:   [] positioning   [] body mechanics   [] transfers   [] heat/ice application    [] other:      Other Objective/Functional Measures:   SALUD Special Tests (pre/post)  HGIR:                                                 Right: 76/90             Left: 80/90  Hip Add Drop Test:                           Right: - Left:+/-  Pelvic Hopewell Drop   Right: -   Left\": past midline   Trunk Rot                                            Right: 14 in  Left 14 in   Shoulder Horizontal Abduction           Right: 44              Left: 32 /40     Hip Add Lift (scored 0-5)  Right: 1/3  Left: 2-3/3       Pain Level (0-10 scale) post treatment: 0    ASSESSMENT/Changes in Function:   Pt tolerated session well. Has had no pain since last session only stiffness. Walked 3 miles on 3 occassions with no flares. Pt is moving to [place] in 2 weeks. Discussed HEP and progress since eval.     []  See Plan of Care  []  See progress note/recertification  [x]  See Discharge Summary         Progress towards goals / Updated goals:  Long Term Goals: LTG- To be accomplished in 14 treatment(s):  1.  Pt will improve hip adduction drop to negative to indicate improved femoral-acetabular mobility needed for gait. .  Eval:positive bilaterally   Last PN: ; Right -; left - :MET 11/4/21        2.  Pt will improve hip adduction lift test to 4/5 bilaterally to indicate proper pelvic position needed for walking .  Eval:0/5 bilaterally  Last PN: 5/25/22 maintaining progress at 3/5 bilaterally    Current: 6/21/22 maintaining above progress     3.  Pt will improve SALUD squat to 3/5 or better to indicate LE, pelvic, hip and back mobility needed for daily activities  Eval:SALUD squat 1/5  Last PN MET 5/25/22 3/5     4.  Pt FOTO score will increase by 10 points to show improvement in function. 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       1.  Pt will be able to return to or start an exercise program without reoccurrence of pain to allow to return to healthy lifestyle.   Last PN[de-identified] progressing 5/25/22 reviewed warm up and progressions    Current: MET 6/21/22 per pt report.      PLAN  []  Upgrade activities as tolerated     [x]  Continue plan of care  []  Update interventions per flow sheet       []  Discharge due to:_  []  Other:_      Andrea Castaneda, PT, DPT 6/21/2022  5:32 PM    Future Appointments   Date Time Provider Kevin Newberry   9/12/2022  7:00 AM Aniyah Hager [address]

## 2022-06-21 NOTE — PROGRESS NOTES
[facility] at the [address], [place], [address] [facility]  Phone: [phone]      Fax:  [phone]            Discharge Summary    Patient name: Taylor Odom Start of Care: 2021   Referral source: Kathrine Anthony MD : 1990               Medical Diagnosis: Low back pain [M54.5]  Sacroiliac joint pain [M53.3]    Onset Date:3 years ago2               Treatment Diagnosis: Lumbopelvic dysfunction    Prior Hospitalization: see medical history Provider#: [id number]   Medications: Verified on Patient summary List    Comorbidities: hx of chronic bladder infection   Prior Level of Function: Increase in pain 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, 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  Pt will improve hip adduction drop to negative to indicate improved femoral-acetabular mobility needed for gait. .  Eval:positive bilaterally   Last PN: ; Right -; left - :MET 21        2.  Pt will improve hip adduction lift test to 4/5 bilaterally to indicate proper pelvic position needed for walking .  Eval:0/5 bilaterally  Last PN: 22 maintaining progress at 3/5 bilaterally    Current: 22 maintaining above progress     3.  Pt will improve SALUD squat to 3/5 or better to indicate LE, pelvic, hip and back mobility needed for daily activities  Eval:SALUD squat 1/5  Last PN MET 22 3/5     4.  Pt FOTO score will increase by 10 points to show improvement in function. 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       1. Pt will be able to return to or start an exercise program without reoccurrence of pain to allow to return to healthy lifestyle.   Last PN[de-identified] progressing 5/25/22 reviewed warm up and progressions    Current: MET 6/21/22 per pt report. Assessment/ Summary of Care:   Pt has been seen in therapy 40x including initial eval. Pt has 3 year hx of SIJ and LBP coinciding with chronic low grade bladder infection that onset ~4 years ago.  Pt hasreported significant  improvement in sx's since starting therapy and  reported increased ability to tolerate daily activities and overall reduction in pain level. Recently has gone >3 weeks without pain, just stiffness. Has walked 3 miles, 3 times since last visit. Pt has reported ability to self manage pain and sx's during a \"flare up\" with performing HEP. Pt has reported increased frequency of pain free days. Have used Postural Restoration principles and interventions to address thoracic and pelvis position, focussed on glut, hamstring, oblique, serratus facilitation along with right adductor inhibition and posterior left pelvic outlet inhibition. Pt is ready for D/C and will be moving out of state in 2 weeks.      RECOMMENDATIONS:  []Discontinue therapy: []Patient has reached or is progressing toward set goals      []Patient is non-compliant or has abdicated      []Due to lack of appreciable progress towards set goals    Norah Gilford, PT, DPT 6/21/2022 7:35 PM

## 2024-11-12 NOTE — PROGRESS NOTES
11/12 called and spoke to patient and rescheduled appt for 11/15    [facility] at [address]  [place], [address] [place] [address] [address]  Phone: [phone]      Fax:  [phone]    Discharge Summary    Patient name: Taylor Nation Start of Care: 2019   Referral Michelle Byrnes MD : 1990               Medical Diagnosis: Low back pain [M54.5]  Payor: Tim Rodriguez / Plan: [address] [place] [address] Avenue / Product Type: PPO /  Onset Date:2018with recent exaccerbations and more frequent pain               Treatment Diagnosis: Pelvic obliquity, Neural tension (B) LE in the sciatic nerve pattern   Prior Hospitalization: see medical history Provider#: [id number]   Medications: Verified on Patient summary List    Comorbidities: Pelvic Floor Dysfunction   Prior Level of Function: walk 3 times a day for a mile and a half with pain 2-3 times a week     Visits from Start of Care: 15                                                Missed Visits: 0    Reporting Period : 19 to 19      Updated Goals: to be achieved in 10 treatments:  1.  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 Pt will reports the ability to complete a full round of golf without needing to stop due to excess fatigue in the LE or gluteus muscles to return to normal activity                         Status at last note/certification: Able to play 9 holes before stopping due to fatigue and pain              Current Status:    Not met          3.   Pt will demonstrate TM ambulation for 1/2 mile with little fatigue for carryover to increased tolerance to usual activity without added fatigue or muscle burning                         Status at last note/certification: Amb 9'96\" for 1/4 mile              Current Status:  Not met       Assessment/ Summary of Care: Patient has shown good progress with this treatment program. Pain as of last visit was 2/10 in the left glute. Patient has shown decreased pain and increased strength and mobility. Patient reports improvement with overall involvement. FOTO score is 63. Patient not seen since last progress note on 5/1/19, updated goals not addressed due to no further follow up visits.       Fall Risk Assessment: Patient demonstrates no Fall Risk    RECOMMENDATIONS:  [x]Discontinue therapy: [x]Patient has reached or is progressing toward set goals      []Patient is non-compliant or has abdicated      []Due to lack of appreciable progress towards set goals    Azeb Yeung, PT 5/27/2019 5:48 PM